# Patient Record
Sex: MALE | Race: BLACK OR AFRICAN AMERICAN | NOT HISPANIC OR LATINO | Employment: FULL TIME | URBAN - METROPOLITAN AREA
[De-identification: names, ages, dates, MRNs, and addresses within clinical notes are randomized per-mention and may not be internally consistent; named-entity substitution may affect disease eponyms.]

---

## 2019-08-04 ENCOUNTER — OFFICE VISIT (OUTPATIENT)
Dept: URGENT CARE | Facility: CLINIC | Age: 54
End: 2019-08-04
Payer: COMMERCIAL

## 2019-08-04 VITALS
TEMPERATURE: 98.7 F | DIASTOLIC BLOOD PRESSURE: 66 MMHG | HEIGHT: 72 IN | BODY MASS INDEX: 42.66 KG/M2 | OXYGEN SATURATION: 97 % | RESPIRATION RATE: 18 BRPM | WEIGHT: 315 LBS | SYSTOLIC BLOOD PRESSURE: 128 MMHG | HEART RATE: 58 BPM

## 2019-08-04 DIAGNOSIS — M54.50 ACUTE RIGHT-SIDED LOW BACK PAIN WITHOUT SCIATICA: Primary | ICD-10-CM

## 2019-08-04 LAB
SL AMB  POCT GLUCOSE, UA: NEGATIVE
SL AMB LEUKOCYTE ESTERASE,UA: NEGATIVE
SL AMB POCT BILIRUBIN,UA: NEGATIVE
SL AMB POCT BLOOD,UA: ABNORMAL
SL AMB POCT CLARITY,UA: YELLOW
SL AMB POCT COLOR,UA: YELLOW
SL AMB POCT KETONES,UA: NEGATIVE
SL AMB POCT NITRITE,UA: NEGATIVE
SL AMB POCT PH,UA: 6.5
SL AMB POCT SPECIFIC GRAVITY,UA: 1.01
SL AMB POCT URINE PROTEIN: NEGATIVE
SL AMB POCT UROBILINOGEN: 0.2

## 2019-08-04 PROCEDURE — 81002 URINALYSIS NONAUTO W/O SCOPE: CPT | Performed by: PHYSICIAN ASSISTANT

## 2019-08-04 PROCEDURE — 99213 OFFICE O/P EST LOW 20 MIN: CPT | Performed by: PHYSICIAN ASSISTANT

## 2019-08-04 RX ORDER — IBUPROFEN 800 MG/1
800 TABLET ORAL EVERY 8 HOURS PRN
Qty: 30 TABLET | Refills: 0 | Status: SHIPPED | OUTPATIENT
Start: 2019-08-04

## 2019-08-04 RX ORDER — METHOCARBAMOL 500 MG/1
500 TABLET, FILM COATED ORAL 3 TIMES DAILY
Qty: 40 TABLET | Refills: 0 | Status: SHIPPED | OUTPATIENT
Start: 2019-08-04

## 2019-08-04 NOTE — PROGRESS NOTES
[facility]        NAME: Jerry Garcia  is a 49 y o  male  : 1965    MRN: [id number]  DATE: 2019  TIME: 1:30 PM    Assessment and Plan   Acute right-sided low back pain without sciatica [M54 5]  1  Acute right-sided low back pain without sciatica  ibuprofen (MOTRIN) 800 mg tablet    methocarbamol (ROBAXIN) 500 mg tablet    Ambulatory referral to Physical Therapy    POCT urine dip         Patient Instructions     Urine dip with trace blood, no CVA tenderness  Does not appear to be urinary, more muscular pain in the back  Follow up with PCP in 3-5 days  Proceed to  ER if symptoms worsen  Chief Complaint     Chief Complaint   Patient presents with    Flank Pain     PT complains symptom started about x1 wk ago         History of Present Illness         49year-old male complains of 2 weeks of right-sided back pain  Says it radiates around to the right side of his hip a little bit  Worse when he lays down or sits up from lying down  He has pain with movement  No change in urination  No blood in his urine  No change in urination smell  No frequency or dysuria  No meds over-the-counter for this  He does report a history of "bulge disc"      Review of Systems   Review of Systems   Constitutional: Negative  HENT: Negative  Eyes: Negative  Respiratory: Negative  Cardiovascular: Negative  Gastrointestinal: Negative  Genitourinary: Negative  Musculoskeletal: Positive for back pain           Current Medications       Current Outpatient Medications:     ibuprofen (MOTRIN) 800 mg tablet, Take 1 tablet (800 mg total) by mouth every 8 (eight) hours as needed for mild pain, Disp: 30 tablet, Rfl: 0    methocarbamol (ROBAXIN) 500 mg tablet, Take 1 tablet (500 mg total) by mouth 3 (three) times a day, Disp: 40 tablet, Rfl: 0    Current Allergies     Allergies as of 2019    (No Known Allergies)            The following portions of the patient's history were reviewed and updated as appropriate: allergies, current medications, past family history, past medical history, past social history, past surgical history and problem list      History reviewed  No pertinent past medical history  History reviewed  No pertinent surgical history  Family History   Problem Relation Age of Onset    Diabetes Mother     Heart disease Father          Medications have been verified  Objective   /66 (BP Location: Right arm, Patient Position: Sitting, Cuff Size: Large)   Pulse 58   Temp 98 7 °F (37 1 °C)   Resp 18   Ht 6' (1 829 m)   Wt (!) 145 kg (319 lb)   SpO2 97%   BMI 43 26 kg/m²        Physical Exam     Physical Exam   Constitutional: He appears well-developed and well-nourished  No distress  Cardiovascular: Normal rate and regular rhythm  Pulmonary/Chest: Effort normal and breath sounds normal    Abdominal: Soft  Bowel sounds are normal  He exhibits no distension  There is no tenderness  There is no CVA tenderness  Musculoskeletal:   Lumbar spine nontender  Right paravertebral muscles and SI joint mildly tender  He has full back range of motion with painful flexion  He has pain with lying down and sitting up  Negative straight leg raise bilaterally  Bilateral lower extremity strength 5/5

## 2019-08-04 NOTE — LETTER
August 4, 2019     Patient: Gabriella Sauceda  YOB: 1965   Date of Visit: 8/4/2019       To Whom it May Concern:    Tabby Hsu is under my professional care  He was seen in my office on 8/4/2019  He may return to work on 8/6/19  If you have any questions or concerns, please don't hesitate to call           Sincerely,          Carley Delacruz PA-C        CC: No Recipients

## 2019-08-04 NOTE — PATIENT INSTRUCTIONS
Acute Low Back Pain   AMBULATORY CARE:   Acute low back pain  is sudden discomfort in your lower back area that lasts for up to 6 weeks  The discomfort makes it difficult to tolerate activity  Common symptoms include the following:   · Back stiffness or spasms    · Pain down the back or side of one leg    · Holding yourself in an unusual position or posture to decrease your back pain    · Not being able to find a sitting position that is comfortable    · Slow increase in your pain for 24 to 48 hours after you stress your back    · Tenderness on your lower back or severe pain when you move your back  Seek immediate care for the following symptoms:   · Severe pain    · Sudden stiffness and heaviness in both buttocks down to both legs    · Numbness or weakness in one leg, or pain in both legs    · Numbness in your genital area or across your lower back    · Unable to control your urine or bowel movements  Contact your healthcare provider if:   · You have a fever  · You have pain at night or when you rest     · Your pain does not get better with treatment  · You have pain that worsens when you cough or sneeze  · You suddenly feel something pop or snap in your back  · You have questions or concerns about your condition or care  The goal of treatment for acute low back pain  is to relieve your pain and help you tolerate activity  Most people with acute lower back pain get better within 4 to 6 weeks  You may need any of the following:  · Acetaminophen  decreases pain  It is available without a doctor's order  Ask how much to take and how often to take it  Follow directions  Acetaminophen can cause liver damage if not taken correctly  · NSAIDs  help decrease swelling and pain  This medicine is available with or without a doctor's order  NSAIDs can cause stomach bleeding or kidney problems in certain people   If you take blood thinner medicine, always ask your healthcare provider if NSAIDs are safe for you  Always read the medicine label and follow directions  · Prescription pain medicine  may be given  Ask your healthcare provider how to take this medicine safely  · Muscle relaxers  decrease pain by relaxing the muscles in your lower spine  Manage your symptoms:   · Stay active  as much as you can without causing more pain  Bed rest could make your back pain worse  Start with some light exercises such as walking  Avoid heavy lifting until your pain is gone  Ask for more information about the activities or exercises that are right for you  · Ice  helps decrease swelling, pain, and muscle spams  Put crushed ice in a plastic bag  Cover it with a towel  Place it on your lower back for 20 to 30 minutes every 2 hours  Do this for about 2 to 3 days after your pain starts, or as directed  · Heat  helps decrease pain and muscle spasms  Start to use heat after treatment with ice has stopped  Use a small towel dampened with warm water or a heating pad, or sit in a warm bath  Apply heat on the area for 20 to 30 minutes every 2 hours for as many days as directed  Alternate heat and ice  Prevent acute low back pain:   · Use proper body mechanics  ¨ Bend at the hips and knees when you  objects  Do not bend from the waist  Use your leg muscles as you lift the load  Do not use your back  Keep the object close to your chest as you lift it  Try not to twist or lift anything above your waist     ¨ Change your position often when you stand for long periods of time  Rest one foot on a small box or footrest, and then switch to the other foot often  ¨ Try not to sit for long periods of time  When you do, sit in a straight-backed chair with your feet flat on the floor  Never reach, pull, or push while you are sitting  · Do exercises that strengthen your back muscles  Warm up before you exercise  Ask your healthcare provider the best exercises for you  · Maintain a healthy weight    Ask your healthcare provider how much you should weigh  Ask him to help you create a weight loss plan if you are overweight  Follow up with your healthcare provider as directed:  Return for a follow-up visit if you still have pain after 1 to 3 weeks of treatment  You may need to visit an orthopedist if your back pain lasts more than 12 weeks  Write down your questions so you remember to ask them during your visits  © 2017 [employer] Information is for End User's use only and may not be sold, redistributed or otherwise used for commercial purposes  All illustrations and images included in CareNotes® are the copyrighted property of A [employer]  or David [employer]  The above information is an  only  It is not intended as medical advice for individual conditions or treatments  Talk to your doctor, nurse or pharmacist before following any medical regimen to see if it is safe and effective for you

## 2019-08-12 ENCOUNTER — EVALUATION (OUTPATIENT)
Dept: PHYSICAL THERAPY | Facility: CLINIC | Age: 54
End: 2019-08-12
Payer: COMMERCIAL

## 2019-08-12 DIAGNOSIS — M54.50 ACUTE RIGHT-SIDED LOW BACK PAIN WITHOUT SCIATICA: ICD-10-CM

## 2019-08-12 PROCEDURE — 97110 THERAPEUTIC EXERCISES: CPT | Performed by: PHYSICAL THERAPIST

## 2019-08-12 PROCEDURE — 97161 PT EVAL LOW COMPLEX 20 MIN: CPT | Performed by: PHYSICAL THERAPIST

## 2019-08-12 NOTE — PROGRESS NOTES
PT Evaluation     Today's date: 2019  Patient name: Dianne Harris  : 1965  MRN: [id number]  Referring provider: Sherin Severin, PA-C  Dx:   Encounter Diagnosis     ICD-10-CM    1  Acute right-sided low back pain without sciatica M54 5 Ambulatory referral to Physical Therapy                  Assessment  Assessment details: Dianne Harris  is a pleasant 49 y o  presenting to physical therapy with MD referral for Acute right-sided low back pain without sciatica  Problem list:  Limited lumbar ROM, decreased hip/core strength, limited lower extremity flexibility, decreased balance, and increased muscle tension  Treatment to include: Manual therapy techniques, lower extremity/core strengthening, neuromuscular control exercises, balance/proprioception training, instruction in a comprehensive HEP, and modalities as needed  This pt would benefit from skilled PT services to address their impairments and functional limitations to maximize functional outcome  Symptom irritability: lowBarriers to therapy: High BMI, financial concerns of high copay ($40 a visit)  Understanding of Dx/Px/POC: good   Prognosis: good    Goals  ST  Pt will improve hip IR to no more than moderate restriction in 2 weeks  2  Pt will improve lumbar SB ROM to at least 20 degrees bilaterally in 2 weeks  LT  Pt will be able to bend forward to  items from the floor with no more than mild discomfort in 4 weeks  2  Pt will be independent in a comprehensive HEP in 4 weeks  Plan  Plan details: Pt would like to attend PT a few sessions due to high copay and would like to learn exercises to perform independently at a gym    Patient would benefit from: skilled physical therapy  Frequency: 1x week  Duration in weeks: 4  Treatment plan discussed with: patient        Subjective Evaluation    History of Present Illness  Mechanism of injury: Patient reports onset of lower back pain 3 weeks ago and he twisted while sitting on a bed and felt spasms/pulling sensation in right side of lower back  Pt states the pain continued to intensify causing him difficulty moving  Pt states he self treated by laying on the couch  Pt reports a week later, he went to Urgent Care who prescribed pt muscle relaxors and ibuprofen  Pt states he got a new mattress and has been taking the muscle relaxors  Pt states since changing his mattress, he has noticed improvement in symptoms  Pt denies any tingling in his legs  Pt states he has gained 50# over the past two years which he attributes as a precipitating factor to his lower back pain  Premorbid status:  - ADLs: Independent with mild difficulty  - Work: Full time, Full duty  - Recreation: none    Current status:   - ADLs/Functional activities:    - Pushing the lawnmower (1 acre) with no pain during, moderate pain/stiffness afterwards   - Bending forward to don/doff socks and shoes with moderate pain and difficulty   - picking items up from the floor with moderate pain    - Sitting 45 minutes prior to increase in pain  - Work: Full time, Full duty-   - Recreation: none  Pain  Current pain ratin  At best pain ratin  At worst pain ratin  Location: across lower back  Quality: tight and cramping  Relieving factors: change in position  Aggravating factors: sitting  Progression: improved    Treatments  Previous treatment: medication  Current treatment: physical therapy  Patient Goals  Patient goals for therapy: decreased pain  Patient goal: learn an exercise routine for the gym        Objective     Palpation   Left   No palpable tenderness to the erector spinae  Right   Muscle spasm in the erector spinae  Tenderness of the erector spinae  Cervical Spine Comments  Right erector spinae: right L4-S1 levels       Active Range of Motion     Lumbar   Flexion: 90 degrees   Extension: 20 degrees   Left lateral flexion: 15 degrees       Right lateral flexion: 15 degrees     Joint Play     Hypomobile: L1, L2, L3, L4, L5 and S1     Pain: L5 and S1   L5 comments: Reports of needle like pain in lower back    Strength/Myotome Testing     Left Hip   Planes of Motion   Flexion: 5  External rotation: 4  Internal rotation: 4    Right Hip   Planes of Motion   Flexion: 5  External rotation: 4  Internal rotation: 4    Left Knee   Flexion: 5  Extension: 5    Right Knee   Flexion: 5  Extension: 5    Left Ankle/Foot   Dorsiflexion: 5  Plantar flexion: 5    Right Ankle/Foot   Dorsiflexion: 5  Plantar flexion: 5    Additional Strength Details  SLS R: 17 seconds  SLS L: 7 seconds    Flexibility:  - HS: minimal restriction on B  - Hip IR: severe restriction on R, mod restriction on L  - Hip ER: minimal restriction B    Tests   Cervical   Negative vertical compression  Lumbar   Negative vertical compression and sacral spring   Left   Negative passive SLR and quadrant  Right   Negative passive SLR and quadrant  Left Hip   Negative JANA  Right Hip   Negative JANA         Flowsheet Rows      Most Recent Value   PT/OT G-Codes   Current Score  57   Projected Score  73             Precautions: 330# (check weight capacity on equipment)      Manual  8-12 (IE)            STM to right lumbar PVMs as needed NV                                                                    Exercise Diary  8-12 (IE)            NuStep 5 mins, levell 5 NV                         Seated:             - pball QL str 4 x 30" ea NV                         Standing:             - hip flexor str 2 x 30" ea NV            - hip ext  2 x 10 ea NV            - hip abd with ext  2 x 10 ea NV                         - front step ups             - lateral step up and overs             - squats                                       Table:             - PPT 10 x 10" NV            - piriformis str 4 x 30" ea NV            - TA Bridges with TB around knees 20 x 5" hold GTB NV            - SL hip ER 20 x 5   hold ea GTB NV                                                       Modalities                                                       * On initial evaluation, educated pt on anatomy, pathology, and exercise rationale  Provided pt with basic HEP and ensured proper exercise performance  Educated pt to call with any questions or concerns  Access Code: [email or web address]   URL: [email or web address]   Date: 08/12/2019   Prepared by: Christopher Mohr      Exercises  · Supine Lower Trunk Rotation - 10 reps - 2 seconds hold - 3x daily  · Supine Single Knee to Chest - 10 reps - 10 seconds hold - 3x daily  · Seated Piriformis Stretch with Trunk Bend - 4 reps - 30 seconds hold - 3x daily

## 2019-08-22 ENCOUNTER — OFFICE VISIT (OUTPATIENT)
Dept: PHYSICAL THERAPY | Facility: CLINIC | Age: 54
End: 2019-08-22
Payer: COMMERCIAL

## 2019-08-22 DIAGNOSIS — M54.50 ACUTE RIGHT-SIDED LOW BACK PAIN WITHOUT SCIATICA: Primary | ICD-10-CM

## 2019-08-22 PROCEDURE — 97110 THERAPEUTIC EXERCISES: CPT

## 2019-08-22 PROCEDURE — 97112 NEUROMUSCULAR REEDUCATION: CPT

## 2019-08-22 NOTE — PROGRESS NOTES
Daily Note     Today's date: 2019  Patient name: Cindy Capps  : 1965  MRN: [id number]  Referring provider: Rachel Ortiz PA-C  Dx:   Encounter Diagnosis     ICD-10-CM    1  Acute right-sided low back pain without sciatica M54 5        Start Time: 1600  Stop Time: 1650  Total time in clinic (min): 50 minutes    Subjective: Patient states, "I feel pretty good today  It's getting better  I think it's a combo of me getting a new bed and adjusting to my new job  I also, have been taking less muscle relaxer"  Objective: See treatment diary below    Assessment: Tolerated treatment well  Patient would benefit from continued PT  Patient had some increase in discomfort during PPT and bridges but he was still able to complete the exercise  Patient is partially compliant to his HEP noting "I do the exercises sometimes  I know I need to do them more often"  Plan: Continue per plan of care        Precautions: 330# (check weight capacity on equipment)    Manual  8-12 (IE)            STM to right lumbar PVMs as needed NV                                                                  Exercise Diary  8- (IE)            NuStep 5 mins, levell 5 NV 5 min L5                        Seated:             - pball QL str 4 x 30" ea NV 4x30"  ea                        Standing:             - hip flexor str 2 x 30" ea NV 2x30" ea           - hip ext  2 x 10 ea NV 2x10 ea           - hip abd with ext  2 x 10 ea NV 2x10 ea                        - front step ups             - lateral step up and overs             - squats                                       Table:             - PPT 10 x 10" NV 10 x 10"           - piriformis str 4 x 30" ea NV 4x30" ea           - TA Bridges with TB around knees 20 x 5" hold GTB NV 10 x 5"  GTB           - SL hip ER 20 x 5   hold ea GTB NV 20 x 5 hold ea GTB                                                    Modalities

## 2019-09-10 NOTE — PROGRESS NOTES
Addendum 9-10-19: Resolved episode of care due to non-compliance with POC frequency  Pt has attended 2 sessions and cancelled/no showed to the last 3 visits

## 2020-10-16 ENCOUNTER — OFFICE VISIT (OUTPATIENT)
Dept: URGENT CARE | Facility: MEDICAL CENTER | Age: 55
End: 2020-10-16
Payer: COMMERCIAL

## 2020-10-16 VITALS — TEMPERATURE: 96 F | HEART RATE: 72 BPM | OXYGEN SATURATION: 98 %

## 2020-10-16 DIAGNOSIS — R19.7 DIARRHEA, UNSPECIFIED TYPE: ICD-10-CM

## 2020-10-16 DIAGNOSIS — J02.9 SORE THROAT: ICD-10-CM

## 2020-10-16 DIAGNOSIS — R05.9 COUGH: Primary | ICD-10-CM

## 2020-10-16 LAB — SARS-COV-2 RNA RESP QL NAA+PROBE: NEGATIVE

## 2020-10-16 PROCEDURE — 99213 OFFICE O/P EST LOW 20 MIN: CPT | Performed by: PHYSICIAN ASSISTANT

## 2020-10-16 PROCEDURE — 87635 SARS-COV-2 COVID-19 AMP PRB: CPT | Performed by: PHYSICIAN ASSISTANT

## 2020-10-16 RX ORDER — BENZONATATE 100 MG/1
100 CAPSULE ORAL 3 TIMES DAILY PRN
Qty: 20 CAPSULE | Refills: 0 | Status: SHIPPED | OUTPATIENT
Start: 2020-10-16

## 2023-03-20 ENCOUNTER — APPOINTMENT (OUTPATIENT)
Dept: RADIOLOGY | Facility: MEDICAL CENTER | Age: 58
End: 2023-03-20

## 2023-03-20 ENCOUNTER — TELEPHONE (OUTPATIENT)
Dept: OBGYN CLINIC | Facility: MEDICAL CENTER | Age: 58
End: 2023-03-20

## 2023-03-20 ENCOUNTER — TELEPHONE (OUTPATIENT)
Dept: OBGYN CLINIC | Facility: CLINIC | Age: 58
End: 2023-03-20

## 2023-03-20 ENCOUNTER — OCCMED (OUTPATIENT)
Dept: URGENT CARE | Facility: MEDICAL CENTER | Age: 58
End: 2023-03-20

## 2023-03-20 DIAGNOSIS — S62.345A CLOSED NONDISPLACED FRACTURE OF BASE OF FOURTH METACARPAL BONE OF LEFT HAND, INITIAL ENCOUNTER: ICD-10-CM

## 2023-03-20 DIAGNOSIS — S67.22XA CRUSHING INJURY OF LEFT HAND, INITIAL ENCOUNTER: ICD-10-CM

## 2023-03-20 DIAGNOSIS — S62.343A CLOSED NONDISPLACED FRACTURE OF BASE OF THIRD METACARPAL BONE OF LEFT HAND, INITIAL ENCOUNTER: Primary | ICD-10-CM

## 2023-03-20 NOTE — TELEPHONE ENCOUNTER
Left message to schedule pt needs asap apt with hand, there is an opening with dr Timothy Lockhart on Thursday please serg or let me know you need assistance when he calls back

## 2023-03-20 NOTE — TELEPHONE ENCOUNTER
Refer to Orthopedics  Diagnosis: fractured left proximal 3rd, 4th metacarpals  What is the rationale for your referral? Evaluation and treat  What are the objective findings on the exam/diagnostics that support the diagnosis? fractures as above  What imaging studies have been done? X-ray  What is the causality of the diagnosis? Work Related  Comment if you would like the examining orthopedist to assist with causality from an orthopedic standpoint   No   [phone]

## 2023-03-20 NOTE — TELEPHONE ENCOUNTER
----- Message from Shira Mandel RN sent at 3/20/2023  1:39 PM EDT -----  Regarding: Please schedule appointment  Hi,     Please schedule Chris Will with Ortho Hand specialist      Refer to Orthopedics  Diagnosis: fractured left proximal 3rd, 4th metacarpals  What is the rationale for your referral? Evaluation and treat  What are the objective findings on the exam/diagnostics that support the diagnosis? fractures as above  What imaging studies have been done? X-ray  What is the causality of the diagnosis? Work Related  Comment if you would like the examining orthopedist to assist with causality from an orthopedic standpoint   No     Thank you,   leon

## 2023-03-23 VITALS — BODY MASS INDEX: 42.66 KG/M2 | HEIGHT: 72 IN | WEIGHT: 315 LBS

## 2023-03-23 DIAGNOSIS — S62.92XA CLOSED FRACTURE OF LEFT HAND, INITIAL ENCOUNTER: Primary | ICD-10-CM

## 2023-03-23 NOTE — LETTER
March 23, 2023     Patient: Christian Dillard  YOB: 1965  Date of Visit: 3/23/2023      To Whom it May Concern:    Jolanta Bourne is under my professional care  Rene Romero was seen in my office on 3/23/2023  Rene Romero is able to work with restrictions  Restrictions include no use of the left hand  If unable to accommodate he should be out of work  These restrictions are in place until next visit  Further updates will be provided at that time  If you have any questions or concerns, please don't hesitate to call           Sincerely,          Salma Mariano MD        CC: No Recipients

## 2023-03-23 NOTE — PROGRESS NOTES
Assessment:    Left base of 5th metacarpal fracture, non-displaced, closed      Plan: Nonweightbearing to the left hand and removable Velcro wrist brace  OK to remove for hygiene and range of motion  Finger range of motion is encouraged  Over-the-counter pain medications as needed  Work note provided  Follow-up 3 to 4 weeks for reevaluation          Problem List Items Addressed This Visit        Musculoskeletal and Integument    Closed left hand fracture - Primary    Relevant Orders    Durable Medical Equipment                Subjective:     HPI    Patient ID:  Kelle Ortega  is a right-hand-dominant 63 y o  male presenting for evaluation of the left hand  According to the patient, few days ago he had a work-related injury  A 55 inch diameter, 2 inch thick piece of steel fell directly on top of his left hand he felt immediate pain and swelling to the area  He eventually had x-rays which demonstrated possible base of third and fourth metacarpal fracture  He was referred here for further evaluation  Today the patient states overall his pain is slightly better however it is still present and well localized to the dorsal ulnar hand with radiation to the mid dorsum region  No associated numbness and tingling into the fingers  He never noticed any open wound  He is a  by [employer]  No history of surgery to the left hand or wrist       The following portions of the patient's history were reviewed and updated as appropriate: allergies, current medications, past family history, past medical history, past social history, past surgical history, and problem list     Review of Systems     Objective:    Imaging:  Left hand x-rays 3/20/2023  IMPRESSION:     Proximal 3rd and 4th metacarpal nondisplaced fractures  Cannot exclude proximal 4th metacarpal and capitate involvement        Physical Exam     General appearance:  NAD   Cardiac:  Regular rate  Lungs:  Unlabored breathing  Abdomen: Non-distended    Orthopedic Examination:  Left hand    Inspection: No open wounds or erythema  There is mild dorsal swelling  No ecchymosis  Palpation: Tender to palpation base of fifth metacarpal   To a lesser degree the base of third and fourth metacarpal region tender  Range-of-motion: Full composite fist formation  Normal wrist range of motion      Strength: Deferred    Sensation: Intact to light touch    Special Tests: Palpable radial pulse

## 2023-03-28 ENCOUNTER — OFFICE VISIT (OUTPATIENT)
Dept: OBGYN CLINIC | Facility: OTHER | Age: 58
End: 2023-03-28

## 2023-03-28 ENCOUNTER — APPOINTMENT (OUTPATIENT)
Dept: RADIOLOGY | Facility: OTHER | Age: 58
End: 2023-03-28

## 2023-03-28 VITALS
HEIGHT: 72 IN | WEIGHT: 315 LBS | DIASTOLIC BLOOD PRESSURE: 75 MMHG | HEART RATE: 77 BPM | BODY MASS INDEX: 42.66 KG/M2 | SYSTOLIC BLOOD PRESSURE: 119 MMHG

## 2023-03-28 DIAGNOSIS — M25.512 ACUTE PAIN OF LEFT SHOULDER: Primary | ICD-10-CM

## 2023-03-28 DIAGNOSIS — M24.812 INTERNAL DERANGEMENT OF LEFT SHOULDER: ICD-10-CM

## 2023-03-28 DIAGNOSIS — M25.512 ACUTE PAIN OF LEFT SHOULDER: ICD-10-CM

## 2023-03-28 NOTE — PROGRESS NOTES
Assessment  Diagnoses and all orders for this visit:    Acute pain of left shoulder    Internal derangement of left shoulder        Discussion and Plan:    Patient has an examination worrisome for acute rotator cuff pathology -specifically an acute rotator cuff tear, given the mechanism of injury with associated weakness on exam he is indicated at this time for an MRI scan to evaluate for surgical pathology  Rotator cuff avulsions have been described with a very similar mechanism especially with trying to extricate the arm from underneath the plate can cause contraction of the cuff musculature and failure at the bone tendon junction which if present the MRI scan will help us understand and guide me in further treatment options  We will review the results of the study when it has been obtained and discuss further treatment options  Recommend patient start physical therapy in the mean-time to maintain and regain range of motion and he certainly is limited at this time with restrictions for his left hand fracture which I would defer any further restrictions for the left shoulder until I have more information to base those restrictions upon  Subjective:   Patient ID: Christina Parmar  is a 63 y o  male      HPI  The patient presents with a chief complaint of left shoulder pain  The pain began 2 week(s) ago and is associated with an acute injury  Patient reports on 3/14/23 a 1400 lb plate fell on his arm  The patient describes the pain as aching, dull and sharp in intensity,  intermittent in timing, and localizes the pain to the  left subacromial joint, anterior shoudler  The pain is worse with movement and raising arm over head and relieved by rest   Patient reports occasional numbness and tingling in the left hand since the injury  The pain is not associated with constitutional symptoms  The patient is not awoken at night by the pain    The pain did begin after the injury on March 14 as documented above   The patient is being seen by Dr Santana Nieves for left 5th MC fractures for this injury as well  The following portions of the patient's history were reviewed and updated as appropriate: allergies, current medications, past family history, past medical history, past social history, past surgical history and problem list       Objective:  /75 (BP Location: Right arm, Patient Position: Sitting, Cuff Size: Adult)   Pulse 77   Ht 6' (1 829 m) Comment: verbal  Wt (!) 156 kg (344 lb)   BMI 46 65 kg/m²       Left Shoulder Exam     Tenderness   Left shoulder tenderness location: subacromial, anterior deltoid  Range of Motion   External rotation: 60   Forward flexion: 130 (PROM 180)     Muscle Strength   Abduction: 3/5   External rotation: 3/5     Tests   Mcgee test: positive  Impingement: positive    Other   Erythema: absent  Sensation: normal  Pulse: present             Physical Exam  Vitals reviewed  Constitutional:       Appearance: He is well-developed  HENT:      Head: Normocephalic  Eyes:      Pupils: Pupils are equal, round, and reactive to light  Pulmonary:      Effort: Pulmonary effort is normal    Abdominal:      General: Abdomen is flat  There is no distension  Skin:     General: Skin is warm and dry  I have personally reviewed pertinent films in PACS and my interpretation is as follows    Left shoulder x-rays demonstrates no fracture or dislocation    Scribe Attestation    I,:  Chelsey Saavedra am acting as a scribe while in the presence of the attending physician :       I,:  Yan Acevedo MD personally performed the services described in this documentation    as scribed in my presence :

## 2023-03-29 ENCOUNTER — TELEPHONE (OUTPATIENT)
Dept: OBGYN CLINIC | Facility: HOSPITAL | Age: 58
End: 2023-03-29

## 2023-03-29 NOTE — TELEPHONE ENCOUNTER
Caller: Lalo from [address] [facility]     Doctor/Office: Kale/Johnie    CB#: [address] [facility] office kindly fax the following OVNS + documents :     What needs to be faxed: DR Betzy Rodriguez + MRI order Dr America Servin + work letter from dr Javi Echeverria to: [facility]     Fax#: [phone]

## 2023-03-31 ENCOUNTER — TELEPHONE (OUTPATIENT)
Dept: OBGYN CLINIC | Facility: HOSPITAL | Age: 58
End: 2023-03-31

## 2023-03-31 NOTE — TELEPHONE ENCOUNTER
Caller: Patient    Doctor: Mykel    Reason for call: Patient asking if the script for the MRI has been faxed?     Call back#: [phone]

## 2023-03-31 NOTE — TELEPHONE ENCOUNTER
ΣΑΡΑΝΤΙ from [facility] MRI Everardo Herrera is needed via patients primary insurance (NOT work comp)    Caller: ΣΑΡΑΝΤΙ from [address] [facility] or [address] [place] [address] Required: MRI L shoulder    Callback#:  [facility] [address]: [address] [place] [address] phone #: N/A    Patient's Member ID: N/A    Date of appt: April 19TH     NPI :  [id number]     FAX# [facility] [phone]

## 2023-03-31 NOTE — TELEPHONE ENCOUNTER
Caller: Leigh Ann Salazar from [address] [place] [address]    Doctor: Johnie/Kale    Reason for call: electronically faxed 3/23 and 3/29 OVN to fax #  [phone] and MRI scripts still needed      Call back#:

## 2023-04-03 ENCOUNTER — TELEPHONE (OUTPATIENT)
Dept: OBGYN CLINIC | Facility: OTHER | Age: 58
End: 2023-04-03

## 2023-04-03 ENCOUNTER — TELEPHONE (OUTPATIENT)
Dept: OBGYN CLINIC | Facility: HOSPITAL | Age: 58
End: 2023-04-03

## 2023-04-03 NOTE — TELEPHONE ENCOUNTER
Caller: Lalo from [address] [place] [address] Group    Doctor: Dr Alo Yancey    Reason for call: Lucita Montenegro from [facility] calling stating that all left shoulder treatment should be on workman's comp  Lucita Montenegro is asking to speak to [facility]      Call back#: [phone]

## 2023-04-03 NOTE — TELEPHONE ENCOUNTER
I spoke with patient regarding his mri scheduled, I called to ask if he has done PT for his left shoulder so I can work on his insurance authorization  Patient stated this is a work comp case and refuses us to Tan Devante his primary insurance  He verified that he has an appt with Jayme Goodman on 4/19 which they sent a request to precert primary insurance for left shoulder mri but again patient repeatedly said he does not want us to precert his primary insurance as this is work comp  I told patient we always precert primary as a back up for w/c but he said he did not want us to precert through his primary insurance  He stated he would get in touch with w/c and [facility] and to not precert primary

## 2023-04-03 NOTE — TELEPHONE ENCOUNTER
Caller: Patient     Doctor: Linda Augustin     Reason for call: Patient would like to speak with someone regarding his W/C covering his MRI  Call was warm transferred to surgery coord         Call back#: [phone]

## 2023-04-13 PROBLEM — S46.012A TRAUMATIC INCOMPLETE TEAR OF LEFT ROTATOR CUFF: Status: ACTIVE | Noted: 2023-04-13

## 2023-05-03 ENCOUNTER — OFFICE VISIT (OUTPATIENT)
Dept: OBGYN CLINIC | Facility: CLINIC | Age: 58
End: 2023-05-03

## 2023-05-03 VITALS
BODY MASS INDEX: 42.66 KG/M2 | SYSTOLIC BLOOD PRESSURE: 140 MMHG | WEIGHT: 315 LBS | DIASTOLIC BLOOD PRESSURE: 72 MMHG | HEIGHT: 72 IN

## 2023-05-03 DIAGNOSIS — S62.92XD CLOSED FRACTURE OF LEFT HAND WITH ROUTINE HEALING, SUBSEQUENT ENCOUNTER: Primary | ICD-10-CM

## 2023-05-03 RX ORDER — COVID-19 ANTIGEN TEST
KIT MISCELLANEOUS
COMMUNITY

## 2023-05-03 NOTE — LETTER
May 3, 2023     Patient: Nikki Herrera  YOB: 1965  Date of Visit: 5/3/2023      To Whom it May Concern:    Chiara Davis is under my professional care  Meño Ansari was seen in my office on 5/3/2023  Meño Ansari may return to work as far as his left hand is concerned  He has Left shoulder issues which are being treated by another provider, so that is presently limiting his full duty status at the present time  If you have any questions or concerns, please don't hesitate to call           Sincerely,          Krystle Marie MD        CC: No Recipients

## 2023-05-04 ENCOUNTER — TELEPHONE (OUTPATIENT)
Dept: OBGYN CLINIC | Facility: HOSPITAL | Age: 58
End: 2023-05-04

## 2023-05-04 NOTE — TELEPHONE ENCOUNTER
Caller: Janak Pimentel - [facility]     Doctor/Office: jessica RIVERA#: [phone]      What needs to be faxed: Office visit note    ATTN to: Janak Pimentel    Fax#: [phone]      Documents were successfully e-faxed

## 2023-05-12 ENCOUNTER — TELEPHONE (OUTPATIENT)
Dept: OBGYN CLINIC | Facility: HOSPITAL | Age: 58
End: 2023-05-12

## 2023-05-12 NOTE — TELEPHONE ENCOUNTER
Caller: [facility]    Doctor: [address] [facility]    Reason for call: WC was calling to verify pt's next appt    Call back#:

## 2023-06-19 ENCOUNTER — OFFICE VISIT (OUTPATIENT)
Dept: OBGYN CLINIC | Facility: OTHER | Age: 58
End: 2023-06-19
Payer: OTHER MISCELLANEOUS

## 2023-06-19 VITALS — WEIGHT: 315 LBS | BODY MASS INDEX: 42.66 KG/M2 | HEIGHT: 72 IN

## 2023-06-19 DIAGNOSIS — S46.012D TRAUMATIC INCOMPLETE TEAR OF LEFT ROTATOR CUFF, SUBSEQUENT ENCOUNTER: Primary | ICD-10-CM

## 2023-06-19 PROCEDURE — 99214 OFFICE O/P EST MOD 30 MIN: CPT | Performed by: ORTHOPAEDIC SURGERY

## 2023-06-19 RX ORDER — CHLORHEXIDINE GLUCONATE 0.12 MG/ML
15 RINSE ORAL ONCE
OUTPATIENT
Start: 2023-06-19 | End: 2023-06-19

## 2023-06-19 NOTE — H&P (VIEW-ONLY)
Assessment  Diagnoses and all orders for this visit:    Traumatic incomplete tear of left rotator cuff, subsequent encounter      Discussion and Plan:  The patient continues to be symptomatic with his left traumatic partial rotator cuff tear despite physical therapy and an injection. He does state the therapist felt he should have surgery to repair the rotator cuff as the therapist felt he was not progressing with physical therapy and that "therapy can only do so much". Given his lack of improvement with appropriate nonoperative care he is indicated for arthroscopic evaluation and repair versus debridement of his partial-thickness rotator cuff tear. He will be scheduled accordingly for this procedure    A thorough discussion was performed with the patient regarding the risks and benefit of operative and nonoperative treatment of their rotator cuff tear. Risks discussed include but were not limited to infection, neurovascular injury, recurrent tear, nonhealing of the repair, need for further surgery, need for biceps tenodesis or tenotomy, stiffness, need for prolonged rehabilitation, as well as the risk of anesthesia. After this discussion all questions were answered and informed consent was obtained in the office for arthroscopic rotator cuff repair of the left shoulder. The patient will be scheduled for this procedure accordingly. Recommend physical therapy be completed at [facility] following surgery for communication purposes if possible. Subjective:   Patient ID: Brandon Pitts. is a 63 y.o. male      HPI  Patient presents today for follow up of left partial supraspinatus tear. Patient was provided with a CS injection and referral to PT at the last visit. This is a WC injury which occurred on 3/14/23 a 1400 lb plate fell on his arm. Patient has been attending PT at [address] [place] [address].        The following portions of the patient's history were reviewed and updated as appropriate: allergies, current medications, past family history, past medical history, past social history, past surgical history and problem list.      Objective:  Ht 6' (1.829 m)   Wt (!) 159 kg (351 lb)   BMI 47.60 kg/m²       Left Shoulder Exam     Range of Motion   External rotation: 70   Forward flexion: 150 (FULL PROM)   Internal rotation 0 degrees: Lumbar     Muscle Strength   Abduction: 4/5   External rotation: 5/5     Other   Erythema: absent  Sensation: normal  Pulse: present             Physical Exam  Vitals and nursing note reviewed. Constitutional:       Appearance: He is well-developed. HENT:      Head: Normocephalic and atraumatic. Eyes:      Pupils: Pupils are equal, round, and reactive to light. Cardiovascular:      Rate and Rhythm: Normal rate and regular rhythm. Pulses: Normal pulses. Heart sounds: Normal heart sounds. Pulmonary:      Effort: Pulmonary effort is normal. No respiratory distress. Breath sounds: Normal breath sounds. Abdominal:      General: Abdomen is flat. There is no distension. Palpations: Abdomen is soft. Musculoskeletal:      Cervical back: Normal range of motion and neck supple. Skin:     General: Skin is warm and dry. Neurological:      Mental Status: He is alert and oriented to person, place, and time. Psychiatric:         Behavior: Behavior normal.           I have personally reviewed pertinent films in PACS and my interpretation is as follows. MRI Left Shoulder 4/6/23: Partial thickness articular sided tear of the supraspinatus tendon without full thickness component.     Scribe Attestation    I,:  Donnellmireya Fordro am acting as a scribe while in the presence of the attending physician.:       I,:  Romel Winslow MD personally performed the services described in this documentation    as scribed in my presence.:

## 2023-06-19 NOTE — PROGRESS NOTES
"  Assessment  Diagnoses and all orders for this visit:    Traumatic incomplete tear of left rotator cuff, subsequent encounter      Discussion and Plan:  The patient continues to be symptomatic with his left traumatic partial rotator cuff tear despite physical therapy and an injection  He does state the therapist felt he should have surgery to repair the rotator cuff as the therapist felt he was not progressing with physical therapy and that \"therapy can only do so much\"  Given his lack of improvement with appropriate nonoperative care he is indicated for arthroscopic evaluation and repair versus debridement of his partial-thickness rotator cuff tear  He will be scheduled accordingly for this procedure    A thorough discussion was performed with the patient regarding the risks and benefit of operative and nonoperative treatment of their rotator cuff tear  Risks discussed include but were not limited to infection, neurovascular injury, recurrent tear, nonhealing of the repair, need for further surgery, need for biceps tenodesis or tenotomy, stiffness, need for prolonged rehabilitation, as well as the risk of anesthesia  After this discussion all questions were answered and informed consent was obtained in the office for arthroscopic rotator cuff repair of the left shoulder  The patient will be scheduled for this procedure accordingly  Recommend physical therapy be completed at [place] [address] following surgery for communication purposes if possible  Subjective:   Patient ID: Moe Harmon  is a 63 y o  male      HPI  Patient presents today for follow up of left partial supraspinatus tear  Patient was provided with a CS injection and referral to PT at the last visit  This is a WC injury which occurred on 3/14/23 a 1400 lb plate fell on his arm  Patient has been attending PT at [address] [place] [address]         The following portions of the patient's history were reviewed and updated as appropriate: " allergies, current medications, past family history, past medical history, past social history, past surgical history and problem list       Objective:  Ht 6' (1 829 m)   Wt (!) 159 kg (351 lb)   BMI 47 60 kg/m²       Left Shoulder Exam     Range of Motion   External rotation: 70   Forward flexion: 150 (FULL PROM)   Internal rotation 0 degrees: Lumbar     Muscle Strength   Abduction: 4/5   External rotation: 5/5     Other   Erythema: absent  Sensation: normal  Pulse: present             Physical Exam  Vitals and nursing note reviewed  Constitutional:       Appearance: He is well-developed  HENT:      Head: Normocephalic and atraumatic  Eyes:      Pupils: Pupils are equal, round, and reactive to light  Cardiovascular:      Rate and Rhythm: Normal rate and regular rhythm  Pulses: Normal pulses  Heart sounds: Normal heart sounds  Pulmonary:      Effort: Pulmonary effort is normal  No respiratory distress  Breath sounds: Normal breath sounds  Abdominal:      General: Abdomen is flat  There is no distension  Palpations: Abdomen is soft  Musculoskeletal:      Cervical back: Normal range of motion and neck supple  Skin:     General: Skin is warm and dry  Neurological:      Mental Status: He is alert and oriented to person, place, and time  Psychiatric:         Behavior: Behavior normal            I have personally reviewed pertinent films in PACS and my interpretation is as follows  MRI Left Shoulder 4/6/23: Partial thickness articular sided tear of the supraspinatus tendon without full thickness component      Scribe Attestation    I,:  [employer] am acting as a scribe while in the presence of the attending physician :       I,:  Augustin Baca MD personally performed the services described in this documentation    as scribed in my presence :

## 2023-06-23 ENCOUNTER — TELEPHONE (OUTPATIENT)
Dept: OBGYN CLINIC | Facility: HOSPITAL | Age: 58
End: 2023-06-23

## 2023-06-23 NOTE — TELEPHONE ENCOUNTER
Caller: [facility]    Doctor: Oscar Friday    Reason for call: she is calling for the 6/19/23 ov note to be faxed to # [phone]    Call back#: [phone]

## 2023-06-26 ENCOUNTER — ANESTHESIA EVENT (OUTPATIENT)
Dept: PERIOP | Facility: HOSPITAL | Age: 58
End: 2023-06-26
Payer: OTHER MISCELLANEOUS

## 2023-06-28 ENCOUNTER — TELEPHONE (OUTPATIENT)
Dept: OBGYN CLINIC | Facility: OTHER | Age: 58
End: 2023-06-28

## 2023-06-28 NOTE — TELEPHONE ENCOUNTER
I called patient to let him know that his surgery has been cancelled by Anesthesia due to his BMI>45  He needs to be rescheduled for Sivakumar which is the reason I was calling to let him know and to reschedule his surgery  I left my direct number for patient to give me a call back and I will call again today to get in touch with him

## 2023-06-30 ENCOUNTER — ANESTHESIA (OUTPATIENT)
Dept: PERIOP | Facility: HOSPITAL | Age: 58
End: 2023-06-30
Payer: OTHER MISCELLANEOUS

## 2023-07-07 NOTE — PRE-PROCEDURE INSTRUCTIONS
Pre-Surgery Instructions:   Medication Instructions   • Naproxen Sodium (Aleve) 220 MG CAPS Stop taking 7 days prior to surgery. Medication instructions for day surgery reviewed. Please use only a sip of water to take your instructed medications. Avoid all over the counter vitamins, supplements and NSAIDS for one week prior to surgery per anesthesia guidelines. Tylenol is ok to take as needed. You will receive a call one business day prior to surgery with an arrival time and hospital directions. If your surgery is scheduled on a Monday, the hospital will be calling you on the Friday prior to your surgery. If you have not heard from anyone by 8pm, please call the hospital supervisor through the hospital  at [phone]. Do not eat or drink anything after midnight the night before your surgery, including candy, mints, lifesavers, or chewing gum. Do not drink alcohol 24hrs before your surgery. Try not to smoke at least 24hrs before your surgery. Follow the pre surgery showering instructions as listed in the [facility] Surgical Experience Booklet” or otherwise provided by your surgeon's office. Do not shave the surgical area 24 hours before surgery. Do not apply any lotions, creams, including makeup, cologne, deodorant, or perfumes after showering on the day of your surgery. No contact lenses, eye make-up, or artificial eyelashes. Remove nail polish, including gel polish, and any artificial, gel, or acrylic nails if possible. Remove all jewelry including rings and body piercing jewelry. Wear causal clothing that is easy to take on and off. Consider your type of surgery. Keep any valuables, jewelry, piercings at home. Please bring any specially ordered equipment (sling, braces) if indicated. Arrange for a responsible person to drive you to and from the hospital on the day of your surgery. Visitor Guidelines discussed.      Call the surgeon's office with any new illnesses, exposures, or additional questions prior to surgery. Please reference your [facility] Surgical Experience Booklet” for additional information to prepare for your upcoming surgery.

## 2023-07-17 ENCOUNTER — TELEPHONE (OUTPATIENT)
Dept: OBGYN CLINIC | Facility: HOSPITAL | Age: 58
End: 2023-07-17

## 2023-07-17 NOTE — TELEPHONE ENCOUNTER
Caller: patient    Doctor: Arlene Jean    Reason for call: patient would like to know if he is going to be overnight at the hospital after surgery as he has to arrange transportation    Call back#: [phone]

## 2023-07-18 ENCOUNTER — HOSPITAL ENCOUNTER (OUTPATIENT)
Facility: HOSPITAL | Age: 58
Setting detail: OUTPATIENT SURGERY
Discharge: HOME | End: 2023-07-18
Attending: ORTHOPAEDIC SURGERY | Admitting: ORTHOPAEDIC SURGERY
Payer: OTHER MISCELLANEOUS

## 2023-07-18 VITALS
DIASTOLIC BLOOD PRESSURE: 83 MMHG | BODY MASS INDEX: 42.66 KG/M2 | OXYGEN SATURATION: 94 % | RESPIRATION RATE: 18 BRPM | SYSTOLIC BLOOD PRESSURE: 147 MMHG | HEIGHT: 72 IN | WEIGHT: 315 LBS | HEART RATE: 70 BPM | TEMPERATURE: 96.7 F

## 2023-07-18 DIAGNOSIS — M24.812 INTERNAL DERANGEMENT OF LEFT SHOULDER: Primary | ICD-10-CM

## 2023-07-18 PROBLEM — E66.01 MORBID OBESITY (HCC): Status: ACTIVE | Noted: 2023-07-18

## 2023-07-18 PROCEDURE — 29823 SHO ARTHRS SRG XTNSV DBRDMT: CPT | Performed by: ORTHOPAEDIC SURGERY

## 2023-07-18 PROCEDURE — 29823 SHO ARTHRS SRG XTNSV DBRDMT: CPT | Performed by: PHYSICIAN ASSISTANT

## 2023-07-18 PROCEDURE — C9290 INJ, BUPIVACAINE LIPOSOME: HCPCS | Performed by: ANESTHESIOLOGY

## 2023-07-18 PROCEDURE — 29828 SHO ARTHRS SRG BICP TENODSIS: CPT | Performed by: ORTHOPAEDIC SURGERY

## 2023-07-18 PROCEDURE — 29828 SHO ARTHRS SRG BICP TENODSIS: CPT | Performed by: PHYSICIAN ASSISTANT

## 2023-07-18 PROCEDURE — C1713 ANCHOR/SCREW BN/BN,TIS/BN: HCPCS | Performed by: ORTHOPAEDIC SURGERY

## 2023-07-18 DEVICE — SP FIBERTAK RC, DBLOAD TAPE BL/W, BLK/W
Type: IMPLANTABLE DEVICE | Site: SHOULDER | Status: FUNCTIONAL
Brand: ARTHREX®

## 2023-07-18 RX ORDER — SODIUM CHLORIDE, SODIUM LACTATE, POTASSIUM CHLORIDE, CALCIUM CHLORIDE 600; 310; 30; 20 MG/100ML; MG/100ML; MG/100ML; MG/100ML
125 INJECTION, SOLUTION INTRAVENOUS CONTINUOUS
Status: DISCONTINUED | OUTPATIENT
Start: 2023-07-18 | End: 2023-07-18 | Stop reason: HOSPADM

## 2023-07-18 RX ORDER — MIDAZOLAM HYDROCHLORIDE 2 MG/2ML
INJECTION, SOLUTION INTRAMUSCULAR; INTRAVENOUS AS NEEDED
Status: DISCONTINUED | OUTPATIENT
Start: 2023-07-18 | End: 2023-07-18

## 2023-07-18 RX ORDER — CHLORHEXIDINE GLUCONATE 0.12 MG/ML
15 RINSE ORAL ONCE
Status: DISCONTINUED | OUTPATIENT
Start: 2023-07-18 | End: 2023-07-18

## 2023-07-18 RX ORDER — OXYCODONE HYDROCHLORIDE 5 MG/1
5 TABLET ORAL EVERY 4 HOURS PRN
Qty: 15 TABLET | Refills: 0 | Status: SHIPPED | OUTPATIENT
Start: 2023-07-18 | End: 2023-07-28

## 2023-07-18 RX ORDER — EPHEDRINE SULFATE 50 MG/ML
INJECTION INTRAVENOUS AS NEEDED
Status: DISCONTINUED | OUTPATIENT
Start: 2023-07-18 | End: 2023-07-18

## 2023-07-18 RX ORDER — BUPIVACAINE HYDROCHLORIDE 5 MG/ML
INJECTION, SOLUTION EPIDURAL; INTRACAUDAL AS NEEDED
Status: DISCONTINUED | OUTPATIENT
Start: 2023-07-18 | End: 2023-07-18

## 2023-07-18 RX ORDER — PROPOFOL 10 MG/ML
INJECTION, EMULSION INTRAVENOUS AS NEEDED
Status: DISCONTINUED | OUTPATIENT
Start: 2023-07-18 | End: 2023-07-18

## 2023-07-18 RX ORDER — HYDROMORPHONE HCL IN WATER/PF 6 MG/30 ML
0.2 PATIENT CONTROLLED ANALGESIA SYRINGE INTRAVENOUS
Status: DISCONTINUED | OUTPATIENT
Start: 2023-07-18 | End: 2023-07-18 | Stop reason: HOSPADM

## 2023-07-18 RX ORDER — SUCCINYLCHOLINE/SOD CL,ISO/PF 100 MG/5ML
SYRINGE (ML) INTRAVENOUS AS NEEDED
Status: DISCONTINUED | OUTPATIENT
Start: 2023-07-18 | End: 2023-07-18

## 2023-07-18 RX ORDER — SODIUM CHLORIDE, SODIUM LACTATE, POTASSIUM CHLORIDE, CALCIUM CHLORIDE 600; 310; 30; 20 MG/100ML; MG/100ML; MG/100ML; MG/100ML
20 INJECTION, SOLUTION INTRAVENOUS CONTINUOUS
Status: CANCELLED | OUTPATIENT
Start: 2023-07-18

## 2023-07-18 RX ORDER — CEFAZOLIN SODIUM 2 G/50ML
2000 SOLUTION INTRAVENOUS ONCE
Status: DISCONTINUED | OUTPATIENT
Start: 2023-07-18 | End: 2023-07-18

## 2023-07-18 RX ORDER — ONDANSETRON 2 MG/ML
INJECTION INTRAMUSCULAR; INTRAVENOUS AS NEEDED
Status: DISCONTINUED | OUTPATIENT
Start: 2023-07-18 | End: 2023-07-18

## 2023-07-18 RX ORDER — ACETAMINOPHEN 325 MG/1
650 TABLET ORAL EVERY 6 HOURS PRN
Status: DISCONTINUED | OUTPATIENT
Start: 2023-07-18 | End: 2023-07-18 | Stop reason: HOSPADM

## 2023-07-18 RX ORDER — FENTANYL CITRATE 50 UG/ML
INJECTION, SOLUTION INTRAMUSCULAR; INTRAVENOUS AS NEEDED
Status: DISCONTINUED | OUTPATIENT
Start: 2023-07-18 | End: 2023-07-18

## 2023-07-18 RX ORDER — LIDOCAINE HYDROCHLORIDE 10 MG/ML
INJECTION, SOLUTION EPIDURAL; INFILTRATION; INTRACAUDAL; PERINEURAL AS NEEDED
Status: DISCONTINUED | OUTPATIENT
Start: 2023-07-18 | End: 2023-07-18

## 2023-07-18 RX ORDER — TRAMADOL HYDROCHLORIDE 50 MG/1
50 TABLET ORAL EVERY 6 HOURS PRN
Status: DISCONTINUED | OUTPATIENT
Start: 2023-07-18 | End: 2023-07-18 | Stop reason: HOSPADM

## 2023-07-18 RX ORDER — DEXAMETHASONE SODIUM PHOSPHATE 10 MG/ML
INJECTION, SOLUTION INTRAMUSCULAR; INTRAVENOUS AS NEEDED
Status: DISCONTINUED | OUTPATIENT
Start: 2023-07-18 | End: 2023-07-18

## 2023-07-18 RX ORDER — ONDANSETRON 2 MG/ML
4 INJECTION INTRAMUSCULAR; INTRAVENOUS ONCE AS NEEDED
Status: DISCONTINUED | OUTPATIENT
Start: 2023-07-18 | End: 2023-07-18 | Stop reason: HOSPADM

## 2023-07-18 RX ORDER — ROCURONIUM BROMIDE 10 MG/ML
INJECTION, SOLUTION INTRAVENOUS AS NEEDED
Status: DISCONTINUED | OUTPATIENT
Start: 2023-07-18 | End: 2023-07-18

## 2023-07-18 RX ORDER — ONDANSETRON 2 MG/ML
4 INJECTION INTRAMUSCULAR; INTRAVENOUS EVERY 6 HOURS PRN
Status: DISCONTINUED | OUTPATIENT
Start: 2023-07-18 | End: 2023-07-18 | Stop reason: HOSPADM

## 2023-07-18 RX ADMIN — SODIUM CHLORIDE, SODIUM LACTATE, POTASSIUM CHLORIDE, AND CALCIUM CHLORIDE: .6; .31; .03; .02 INJECTION, SOLUTION INTRAVENOUS at 14:16

## 2023-07-18 RX ADMIN — SODIUM CHLORIDE, SODIUM LACTATE, POTASSIUM CHLORIDE, AND CALCIUM CHLORIDE 125 ML/HR: .6; .31; .03; .02 INJECTION, SOLUTION INTRAVENOUS at 12:03

## 2023-07-18 RX ADMIN — LIDOCAINE HYDROCHLORIDE 50 MG: 10 INJECTION, SOLUTION EPIDURAL; INFILTRATION; INTRACAUDAL at 13:35

## 2023-07-18 RX ADMIN — ROCURONIUM BROMIDE 40 MG: 10 INJECTION, SOLUTION INTRAVENOUS at 13:45

## 2023-07-18 RX ADMIN — SUGAMMADEX 300 MG: 100 INJECTION, SOLUTION INTRAVENOUS at 14:37

## 2023-07-18 RX ADMIN — BUPIVACAINE 20 ML: 13.3 INJECTION, SUSPENSION, LIPOSOMAL INFILTRATION at 12:38

## 2023-07-18 RX ADMIN — BUPIVACAINE HYDROCHLORIDE 10 ML: 5 INJECTION, SOLUTION EPIDURAL; INTRACAUDAL; PERINEURAL at 12:38

## 2023-07-18 RX ADMIN — CEFAZOLIN 3000 MG: 10 INJECTION, POWDER, FOR SOLUTION INTRAVENOUS at 13:46

## 2023-07-18 RX ADMIN — EPHEDRINE SULFATE 5 MG: 50 INJECTION INTRAVENOUS at 14:22

## 2023-07-18 RX ADMIN — MIDAZOLAM 2 MG: 1 INJECTION INTRAMUSCULAR; INTRAVENOUS at 12:30

## 2023-07-18 RX ADMIN — DEXAMETHASONE SODIUM PHOSPHATE 10 MG: 10 INJECTION, SOLUTION INTRAMUSCULAR; INTRAVENOUS at 13:36

## 2023-07-18 RX ADMIN — EPHEDRINE SULFATE 10 MG: 50 INJECTION INTRAVENOUS at 13:55

## 2023-07-18 RX ADMIN — PROPOFOL 200 MG: 10 INJECTION, EMULSION INTRAVENOUS at 13:35

## 2023-07-18 RX ADMIN — PROPOFOL 50 MG: 10 INJECTION, EMULSION INTRAVENOUS at 13:40

## 2023-07-18 RX ADMIN — EPHEDRINE SULFATE 5 MG: 50 INJECTION INTRAVENOUS at 14:16

## 2023-07-18 RX ADMIN — PROPOFOL 100 MG: 10 INJECTION, EMULSION INTRAVENOUS at 13:36

## 2023-07-18 RX ADMIN — FENTANYL CITRATE 100 MCG: 50 INJECTION INTRAMUSCULAR; INTRAVENOUS at 12:30

## 2023-07-18 RX ADMIN — Medication 100 MG: at 13:35

## 2023-07-18 RX ADMIN — HYDROMORPHONE HYDROCHLORIDE 0.2 MG: 0.2 INJECTION, SOLUTION INTRAMUSCULAR; INTRAVENOUS; SUBCUTANEOUS at 15:06

## 2023-07-18 RX ADMIN — ONDANSETRON 4 MG: 2 INJECTION INTRAMUSCULAR; INTRAVENOUS at 13:35

## 2023-07-18 NOTE — ANESTHESIA PREPROCEDURE EVALUATION
Procedure:  REPAIR ROTATOR CUFF  ARTHROSCOPIC (Left: Shoulder)    Relevant Problems   Musculoskeletal and Integument   (+) Internal derangement of left shoulder      Other   (+) Morbid obesity (HCC)        Physical Exam    Airway    Mallampati score: III  TM Distance: >3 FB  Neck ROM: full     Dental       Cardiovascular      Pulmonary      Other Findings        Anesthesia Plan  ASA Score- 2     Anesthesia Type- general with ASA Monitors. Additional Monitors:   Airway Plan: ETT. Comment: Left isb with exparel. Plan Factors-    Chart reviewed. EKG reviewed. Existing labs reviewed. Patient summary reviewed. Induction- intravenous. Postoperative Plan- Plan for postoperative opioid use. Informed Consent- Anesthetic plan and risks discussed with patient. I personally reviewed this patient with the CRNA. Discussed and agreed on the Anesthesia Plan with the CRNA. Andrew Del Toro

## 2023-07-18 NOTE — INTERVAL H&P NOTE
H&P reviewed. After examining the patient I find no changes in the patients condition since the H&P had been written.     Vitals:    07/18/23 1114   BP: 147/88   Pulse: 63   Resp: 18   Temp: (!) 96.7 °F (35.9 °C)   SpO2: 98%

## 2023-07-18 NOTE — DISCHARGE INSTR - AVS FIRST PAGE
Care after Procedure:   1. You should keep your arm in the sling at all times except for therapy (including elbow/wrist/hand range of motion) and to shower. 2.  You may move the wrist and hand is much as tolerated as this may help with any swelling of the hand which may occur from being in the sling. 3.  Apply ice to the shoulder (20 minutes on and 20 minutes off) or use the cold therapy unit you may have purchased. Make sure that the eyes is not in direct contact with your skin. 4.  Use the pain medication as prescribed. 5.  You may remove bandages the day after surgery. At that point you may shower, but dry the area thoroughly after showering. There is a bluish-tinted dermal glue over your incisions. The sutures are self dissolving and do not need to be removed. Call your doctor at [phone] for the followin. Tingling, numbness, coldness or excessive swelling of the hand or fingers. 2.  Redness, swelling, or excessive drainage from surgical wounds. 3.  Pain unresponsive to the medication provided. Anesthesia precautions:  1. General Anesthesia:  A. Have a responsible person drive you home and stay with you at home. B.  Relax and Rest for 24 hours. C.  Drink clear liquids until you are certain there is no nausea or vomiting. D.  Do not drink alcohol, drive a vehicle, operate any mechanical equipment or make any critical decisions for at least 24 hours. 2.  Regional anesthesia:  The block you received should last approximately 12-24 hours. Make sure that you protect your arm during that time from excessive heat or pressure, as your sensation will be altered. As soon as you feel "Pins and Needles" in your arm that means that the block is about to wear off - make sure you have taken your  pain medicine if you have not already. Medication:   1. Please take pain medication as directed on prescription.   2.  Typically we provide Oxycodone for immediate post-operative pain, unless you have an allergy or that medication is contra-indicated. 3.  You may take Tylenol in addition to Oxycodone if needed for pain. Please refer to the bottle for directions. Follow Up:   A follow up appointment should have been made pre-operatively. If not, please call the office at the above number for an appointment 3-5 days after surgery.

## 2023-07-18 NOTE — ANESTHESIA PROCEDURE NOTES
Peripheral Block    Patient location during procedure: holding area  Start time: 7/18/2023 12:38 PM  Reason for block: at surgeon's request and post-op pain management  Staffing  Performed by: Shayy Lee MD  Authorized by: Shayy Lee MD    Preanesthetic Checklist  Completed: patient identified, IV checked, site marked, risks and benefits discussed, surgical consent, monitors and equipment checked, pre-op evaluation and timeout performed  Peripheral Block  Patient position: supine  Prep: ChloraPrep  Patient monitoring: continuous pulse ox and frequent blood pressure checks  Block type: interscalene  Laterality: left  Injection technique: single-shot  Procedures: ultrasound guided, Ultrasound guidance required for the procedure to increase accuracy and safety of medication placement and decrease risk of complications.   Ultrasound permanent image saved  Needle  Needle type: Stimuplex   Needle gauge: 22 G  Needle length: 5 cm  Needle localization: anatomical landmarks and ultrasound guidance  Test dose: negative  Assessment  Injection assessment: incremental injection, local visualized surrounding nerve on ultrasound, negative aspiration for heme and no paresthesia on injection  Paresthesia pain: none  Post-procedure:  site cleaned  patient tolerated the procedure well with no immediate complications

## 2023-07-18 NOTE — ANESTHESIA POSTPROCEDURE EVALUATION
Post-Op Assessment Note    CV Status:  Stable  Pain Score: 0    Pain management: adequate  Multimodal analgesia used between 6 hours prior to anesthesia start to PACU discharge    Mental Status:  Alert   Hydration Status:  Euvolemic   PONV Controlled:  None   Airway Patency:  Patent      Post Op Vitals Reviewed: Yes      Staff: CRNA         There were no known notable events for this encounter.     BP   127/75   Temp  98.2   Pulse  91   Resp   18   SpO2   99

## 2023-07-18 NOTE — OP NOTE
OPERATIVE REPORT  PATIENT NAME: Barbara Madera. :  1965  MRN: [id number]  Pt Location:  OR ROOM 15    SURGERY DATE: 2023     SURGEON: Nilsa Fields MD     ASSISTANT: Donata Scheuermann, PA-C     NOTE: Donata Scheuermann PA-C was present throughout the entire procedure and performed essential assistance with patient prepping, draping, positioning, suture management, wound closure, sterile dressing application and sling application, all under my direct supervision. NOTE: No qualified resident physician was available for assistance    PREOPERATIVE DIAGNOSIS:  Left Shoulder Partial Rotator Cuff Tear    POSTOPERATIVE DIAGNOSIS: Left Shoulder Type IV SLAP Tear    PROCEDURES: Surgical Arthroscopy Left Shoulder with Long Head Biceps Tenodesis and Extensive Debridement    ANESTHESIA STAFF: Kecia Miller MD     ANESTHESIA TYPE: General endotracheal tube with ultrasound guided interscalene block (Exparel). The interscalene block was provided by the anesthesia staff per my request for postoperative pain control and to decrease the use of postoperative narcotic medication for pain control. COMPLICATIONS: None    FINDINGS: Type IV SLAP Tear, No Articular Sided or Bursal Sided Rotator Cuff Tear    SPECIMEN(S):  None    ESTIMATED BLOOD LOSS: Minimal    INDICATION:  Briefly, the patient is a 63 y.o.  male with left shoulder pain following an acute injury. MRI scan suggested a partial rotator cuff tear. The patient elected for arthroscopic treatment after failing to return to his preinjury level of function despite physical therapy, activity modification and time. Informed consent was obtained after a thorough discussion of the risks and benefits of the procedure, as well as alternatives to the procedure. OPERATIVE TECHNIQUE:  On the day of surgery, I identified the patient’s left shoulder and marked it with my initials.  The patient was taken to the operating room where anesthesia was induced and 3 grams of IV Cefazolin were given. The patient was examined in the supine position and was found to have full range of motion of the left shoulder with no instability by load and shift testing. The patient was then positioned in the [address] [place] [address] chair position. All bony prominences were padded. The shoulder was prepped and draped in normal sterile fashion. After a time-out for safety, a standard posterior arthroscopic portal was made. Glenohumeral evaluation revealed intact glenohumeral articular cartilage with no loose bodies. There was a Type IV SLAP tear which was not anticipated based on preoperative imaging but was not surprising based on the patient's described mechanism of injury of an eccentric contracture of the shoulder. The undersurface of the supraspinatus and infraspinatus were intact without partial tear, superior border subscapularis did have some fraying but was not detached from the lesser tuberosity. The Type IV SLAP tear did split the base of the long head biceps tendon and extend into the biceps tendon itself while also progressing anteriorly and posteriorly into the superior aspect of the anterior and posterior labrum. The attachment sites of the anterior band and posterior band of the infra glenohumeral ligament were intact. Given the Type IV SLAP tear a long head biceps tenodesis was indicated so an anterior portal was established and an Arthrex all suture 2.6 mm anchor was placed at the superior aspect of lesser tuberosity, a looping whipstitch was placed through and around long head of biceps which was then released off the superior glenoid and tied off to the lesser tuberosity performing our intra-articular long head biceps tenodesis.   Through the anterior cannula an extensive debridement glenohumeral joint was then performed including the superior labrum, the anterior labrum, the posterior labrum, and the superior border of subscapularis using a shaving device to a stable border. After this debridement the labrum was probed and found to not require suture repair. After the intra-articular work was completed, the scope was then placed in the subacromial space through a posterolateral portal where a thorough bursectomy was performed. The bursal surface of the rotator cuff had no evidence of partial tear and no fraying of the CA ligament to suggest subacromial impingement so a formal subacromial decompression was not performed however the extensive debridement included the debridement of the subacromial bursa in order to visualize the bursal surface of the supraspinatus and infraspinatus. The area was then irrigated. Scope was withdrawn. Wounds were closed with 4-0 Monocryl and Histoacryl. Sterile dressings and a sling with an abduction pillow was placed. The patient was awoken without complication and returned to the recovery room in good condition. We will see the patient back in the office next week to initiate therapy following biceps tenodesis rehabilitation protocol. At the end of procedure, the counts were correct.      PATIENT DISPOSITION:  Stable to PACU      SIGNATURE: Ric Leyva MD  DATE: July 18, 2023  TIME: 2:34 PM

## 2023-07-24 ENCOUNTER — OFFICE VISIT (OUTPATIENT)
Dept: OBGYN CLINIC | Facility: OTHER | Age: 58
End: 2023-07-24

## 2023-07-24 VITALS
SYSTOLIC BLOOD PRESSURE: 135 MMHG | HEIGHT: 72 IN | HEART RATE: 60 BPM | WEIGHT: 315 LBS | BODY MASS INDEX: 42.66 KG/M2 | DIASTOLIC BLOOD PRESSURE: 77 MMHG

## 2023-07-24 DIAGNOSIS — Z47.89 AFTERCARE FOLLOWING SURGERY OF THE MUSCULOSKELETAL SYSTEM: Primary | ICD-10-CM

## 2023-07-24 PROCEDURE — 99024 POSTOP FOLLOW-UP VISIT: CPT | Performed by: PHYSICIAN ASSISTANT

## 2023-07-24 RX ORDER — ACETAMINOPHEN 500 MG
500 TABLET ORAL EVERY 6 HOURS PRN
COMMUNITY

## 2023-07-24 NOTE — PATIENT INSTRUCTIONS
1. Sling as per protocol  2. PT per biceps tenodesis protocol  2 weeks in sling and protect from elbow flexion with resistance  Then advance ROM and strengthening as tolerated  3.  Follow-up in 6 weeks

## 2023-07-24 NOTE — PROGRESS NOTES
Assessment:       1. Aftercare following surgery of the musculoskeletal system          Plan:        Patient is doing well postoperatively. Operative note, images, and biceps tenodesis rehab protocol were discussed. Patient is to remain in the sling for 2 weeks and refrain from any resisted flexion of the left elbow. Once sling is discontinued in 2 weeks, he may advance range of motion and strengthening as tolerated. All questions were addressed to the patient's satisfaction. Patient has an appointment with PT. an out of work note has been placed in his chart. Follow-up will be in 6 weeks to assess patient's progress. Subjective:     Patient ID: Padmini Lew is a 63 y.o. male. Chief Complaint:    HPI    Presents to the office for follow-up status post left shoulder arthroscopy with biceps tenodesis on 7/18/2023. Pain is controlled and he has no residual paresthesia following anesthetic block. Social History     Occupational History   • Not on file   Tobacco Use   • Smoking status: Never   • Smokeless tobacco: Never   Vaping Use   • Vaping Use: Never used   Substance and Sexual Activity   • Alcohol use: Yes     Comment: once per month   • Drug use: Never   • Sexual activity: Not on file      Review of Systems   Constitutional: Negative. Respiratory: Negative. Cardiovascular: Negative. Gastrointestinal: Negative. Musculoskeletal: Positive for arthralgias and myalgias. Skin: Positive for wound. Neurological: Positive for weakness. Negative for numbness. Psychiatric/Behavioral: Negative. Objective:     Ortho ExamPhysical Exam  HENT:      Head: Atraumatic. Cardiovascular:      Pulses: Normal pulses. Pulmonary:      Effort: Pulmonary effort is normal.   Musculoskeletal:      Comments: Left shoulder range of motion not tested. Skin:     General: Skin is warm and dry. Capillary Refill: Capillary refill takes less than 2 seconds.       Comments: Surgical incisions dry and clean   Neurological:      Mental Status: He is alert and oriented to person, place, and time. Sensory: No sensory deficit.    Psychiatric:         Mood and Affect: Mood normal.         Behavior: Behavior normal.

## 2023-07-24 NOTE — LETTER
July 24, 2023     Patient: Juanna Duverney. YOB: 1965  Date of Visit: 7/24/2023      To Whom it May Concern:    Kristen Harvey is under my professional care. Catrachita Vora was seen in my office on 7/24/2023. Catrachita Vora is to remain out of work until further evaluation in approximately 6 weeks. If you have any questions or concerns, please don't hesitate to call.          Sincerely,          Jaiden Mantilla PA-C        CC: No Recipients

## 2023-07-25 ENCOUNTER — TELEPHONE (OUTPATIENT)
Dept: OBGYN CLINIC | Facility: HOSPITAL | Age: 58
End: 2023-07-25

## 2023-07-25 ENCOUNTER — EVALUATION (OUTPATIENT)
Dept: PHYSICAL THERAPY | Facility: CLINIC | Age: 58
End: 2023-07-25
Payer: OTHER MISCELLANEOUS

## 2023-07-25 DIAGNOSIS — S46.012D TRAUMATIC INCOMPLETE TEAR OF LEFT ROTATOR CUFF, SUBSEQUENT ENCOUNTER: ICD-10-CM

## 2023-07-25 DIAGNOSIS — Z47.89 AFTERCARE FOLLOWING SURGERY OF THE MUSCULOSKELETAL SYSTEM: Primary | ICD-10-CM

## 2023-07-25 PROCEDURE — 97110 THERAPEUTIC EXERCISES: CPT

## 2023-07-25 PROCEDURE — 97161 PT EVAL LOW COMPLEX 20 MIN: CPT

## 2023-07-25 NOTE — PROGRESS NOTES
PT Evaluation     Today's date: 2023  Patient name: Kvng Gillespie. : 1965  MRN: [id number]   Referring provider: Aranza Garrett*  Dx:   Encounter Diagnosis     ICD-10-CM    1. Aftercare following surgery of the musculoskeletal system  Z47.89       2. Traumatic incomplete tear of left rotator cuff, subsequent encounter  S46.012D Ambulatory Referral to Physical Therapy                     Assessment  Assessment details: Kvng Isaacs is a 63 y.o. male presenting to physical therapy for an initial evaluation status post left shoulder arthroscopy, extensive debridement, and biceps tenodesis on 2023 The patient demonstrates decreased and painful left shoulder and elbow PROM. Shoulder and elbow ROM as well as MMTs were deferred this session secondary to post operative status. He has been compliant with sling usage and was advised on 2 week restriction in sling as well as no resisted elbow flexion per post operative appointment with surgeon. These deficits have limited the patient's ability to complete ADLs, vocational responsibilities, and recreational activities. This patient would benefit from OP PT services to address their impairments and functional limitations to maximize functional mobility. Due to discrepancies in patient reports of protocol and sling wear time, the patient's surgeons office will be contacted for clarification. he was instructed on OTC medication and applying ice as needed for pain. He was provided a HEP for shoulder PROM and elbow AAROM and demonstrated/verbalized understanding all education and HEP. Impairments: abnormal or restricted ROM, activity intolerance, impaired physical strength, lacks appropriate home exercise program and pain with function    Symptom irritability: moderateUnderstanding of Dx/Px/POC: excellent   Prognosis: good    Goals  STG to be achieved in 4 weeks:    The patient will report a decrease in left shoulder "at worst" subjective pain rating score to at least 5/10 to allow for improved tolerance for functional activities. The patient will increase shoulder flexion AROM to at least 100 degrees to allow for improved functional mobility. The patient will increase elbow extension AROM to 0 degrees to allow for improved functional mobility. The patient will increase elbow flexion MMT score to at least 3/5 to allow for improved functional mobility. LTG to be achieved by DC: The patient will be independent in comprehensive [facility]. The patient will report no pain with usual activities. The patient will improve all left shoulder AROM to [facility] to allow for improved functional mobility. The patient will improve left elbow flexion and extension AROM to [facility] to allow for improved functional mobility. The patient will increase left elbow flexion and extension MMT score to [facility] to allow for improved functional mobility. The patient will be able to lift 100lb box from waist to chest height. The patient will be able to return to work without restrictions or difficulty. Plan  Patient would benefit from: skilled physical therapy  Planned modality interventions: thermotherapy: hydrocollator packs, TENS and cryotherapy  Planned therapy interventions: manual therapy, joint mobilization, neuromuscular re-education, patient education, flexibility, strengthening, stretching, therapeutic activities, therapeutic exercise, home exercise program and postural training  Frequency: 2x week  Duration in weeks: 10  Plan of Care beginning date: 7/25/2023  Plan of Care expiration date: 10/3/2023  Treatment plan discussed with: patient        Subjective Evaluation    History of Present Illness  Mechanism of injury: Ayanna Mckeon presents to therapy status post left shoulder arthroscopy with biceps tenodesis on 7/18/2023.  He was instructed to remain in the sling for 2 weeks however no resisted flexion of the elbow nor carrying any objects in the left arm. Per follow up appointment with surgeon's office, "once sling is discontinued in 2 weeks, he may advance range of motion and strengthening as tolerated." He reports that he hasn't slept in bed due to keeping his arm in a comfortable position at this time. He states that his shoulder has been painful and has been taking tylenol for pain, however this does upset his stomach. He reports that he plans to ice his shoulder following today. 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 Pain  Current pain ratin  At best pain ratin  At worst pain rating: 10  Location: left shoulder  Quality: sharp and throbbing  Relieving factors: medications and ice  Aggravating factors: overhead activity and lifting    Social Support    Working: - out of work at this time   Hand dominance: right          Objective     Observations     Additional Observation Details  3 arthroscopic incisions well approximated, no signs of infection.  Sutures removed     Active Range of Motion     Right Shoulder   Flexion: 160 degrees   Abduction: 158 degrees   External rotation BTH: T3   Internal rotation BTB: T6     Right Elbow   Normal active range of motion    Additional Active Range of Motion Details  No left shoulder AROM at this time     Passive Range of Motion   Left Shoulder   Flexion: 100 degrees with pain  Abduction: 85 degrees with pain  External rotation 45°: 32 degrees with pain  Internal rotation 45°: 30 degrees with pain    Left Elbow   Flexion: 132 degrees with pain  Extension: -20 degrees with pain    Strength/Myotome Testing     Right Shoulder     Planes of Motion   Flexion: 5   Abduction: 5   External rotation at 0°: 5   Internal rotation at 0°: 5     Right Elbow   Flexion: 5  Extension: 5    Additional Strength Details  No left shoulder MMT at this time   No left elbow flexion MMTs at this time              Precautions: status post left shoulder arthroscopy, extensive debridement, and biceps tenodesis on 7/18/2023  Per ortho on 7/25/23: "Sling protection x 2 weeks. Allow for full AROM and PROM glenohumeral joint. Allow active extension and passive flexion of the elbow to protect the biceps. After 2 weeks D/C sling full active and passive ROM of [address] jt and elbow jt. With strengthening to begin at 6 weeks"      Access Code: [email or web address]     POC expires Auth Status Unit limit Start date  Expiration date PT/OT + Visit Limit?  Copay/ Co- Insurance   10/3    Re-eval by 8/25 N/A N/A N/A N/A WC BOMN               Visit/Unit Tracking  AUTH Status:  Date 7/25              BOMN Used 1               Remaining                    Manuals 7/25            L Elbow flex/ext   supination/pronation PROM             L Shoulder PROM                                       Neuro Re-Ed                                                                                                        Ther Ex             Pulleys              Pendulums- fwd/bwd, left/right, cw/ccw HEP            Elbow flexion AAROM HEP            Standing elbow ext stretch at wall with towel  HEP seated            Table slides flex/scapt              Supine shoulder flexion AAROM with cane             Supine shoulder ER AAROM with cane              Wrist flex/ext AROM             Gripping with metal gripper             Ther Activity                                       Gait Training                                       Modalities

## 2023-07-25 NOTE — TELEPHONE ENCOUNTER
Caller: Jay from PT    Doctor: Marv Killian    Reason for call: Isidro Nger states patient must remain in sling for 2 weeks, patient states he was told 1 week. Please clarify to PT.  Also requesting protocol     Call back#: [phone]

## 2023-07-27 ENCOUNTER — OFFICE VISIT (OUTPATIENT)
Dept: PHYSICAL THERAPY | Facility: CLINIC | Age: 58
End: 2023-07-27
Payer: OTHER MISCELLANEOUS

## 2023-07-27 DIAGNOSIS — S46.012D TRAUMATIC INCOMPLETE TEAR OF LEFT ROTATOR CUFF, SUBSEQUENT ENCOUNTER: ICD-10-CM

## 2023-07-27 DIAGNOSIS — Z47.89 AFTERCARE FOLLOWING SURGERY OF THE MUSCULOSKELETAL SYSTEM: Primary | ICD-10-CM

## 2023-07-27 PROCEDURE — 97140 MANUAL THERAPY 1/> REGIONS: CPT

## 2023-07-27 PROCEDURE — 97110 THERAPEUTIC EXERCISES: CPT

## 2023-07-27 NOTE — PROGRESS NOTES
Daily Note     Today's date: 2023  Patient name: Angel Acosta. : 1965  MRN: [id number]  Referring provider: Eleno Arzola*  Dx:   Encounter Diagnosis     ICD-10-CM    1. Aftercare following surgery of the musculoskeletal system  Z47.89       2. Traumatic incomplete tear of left rotator cuff, subsequent encounter  S46.012D                      Subjective: Patient reports that he did complete his exercises at home and has his son help him do the lifting of the elbow. Objective: See treatment diary below      Assessment: Tolerated treatment well. Patient able to complete all exercises without increased pain. Cued to complete pulleys and table slides until a stretch is felt in the shoulder, without aggressive ROM. Discussed with patient his current restrictions of sling usage and no active elbow flexion. Patient demonstrated fatigue post treatment, exhibited good technique with therapeutic exercises and would benefit from continued PT      Plan: Continue per plan of care. Precautions: status post left shoulder arthroscopy, extensive debridement, and biceps tenodesis on 2023  Per ortho on 23: "Sling protection x 2 weeks. Allow for full AROM and PROM glenohumeral joint. Allow active extension and passive flexion of the elbow to protect the biceps. After 2 weeks D/C sling full active and passive ROM of [address] jt and elbow jt. With strengthening to begin at 6 weeks"      Access Code: [email or web address]      POC expires Auth Status Unit limit Start date  Expiration date PT/OT + Visit Limit?  Copay/ Co- Insurance   10/3    Re-eval by  N/A N/A N/A N/A WC BOMN               Visit/Unit Tracking  AUTH Status:  Date              BOMN Used 1 2              Remaining                    Manuals            L Elbow flex/ext   supination/pronation PROM  BE           L Shoulder PROM  BE                                     Neuro Re-Ed Ther Ex             Pulleys   5 min           Pendulums- fwd/bwd, left/right, cw/ccw HEP 1 min ea dir            Elbow flexion PROM with opp UE HEP x20           Standing elbow ext stretch at wall with towel  HEP seated seated x20           Table slides flex/scapt   5"x15 ea           Supine shoulder flexion AAROM with cane             Supine shoulder ER AAROM with cane              Wrist flex/ext AROM  1# x20 ea            Gripping with metal gripper  x20           Pt education  restrictions, HEP           Ther Activity                                       Gait Training                                       Modalities

## 2023-08-01 ENCOUNTER — OFFICE VISIT (OUTPATIENT)
Dept: PHYSICAL THERAPY | Facility: CLINIC | Age: 58
End: 2023-08-01
Payer: OTHER MISCELLANEOUS

## 2023-08-01 DIAGNOSIS — Z47.89 AFTERCARE FOLLOWING SURGERY OF THE MUSCULOSKELETAL SYSTEM: Primary | ICD-10-CM

## 2023-08-01 DIAGNOSIS — S46.012D TRAUMATIC INCOMPLETE TEAR OF LEFT ROTATOR CUFF, SUBSEQUENT ENCOUNTER: ICD-10-CM

## 2023-08-01 PROCEDURE — 97110 THERAPEUTIC EXERCISES: CPT

## 2023-08-01 PROCEDURE — 97140 MANUAL THERAPY 1/> REGIONS: CPT

## 2023-08-01 NOTE — PROGRESS NOTES
Daily Note     Today's date: 2023  Patient name: Sailaja Hdz. : 1965  MRN: [id number]  Referring provider: Taz Ch*  Dx:   Encounter Diagnosis     ICD-10-CM    1. Aftercare following surgery of the musculoskeletal system  Z47.89       2. Traumatic incomplete tear of left rotator cuff, subsequent encounter  S46.012D                      Subjective: Patient reports that his "body doesn't feel good". Objective: See treatment diary below      Assessment: Tolerated treatment well. Added supine shoulder flexion AAROM with cane to facilitate improved shoulder ROM. Patient noted pain at end range and therefore cued to decrease to pain free ranges. Patient exhibited good technique with therapeutic exercises and would benefit from continued PT      Plan: Continue per plan of care. Precautions: status post left shoulder arthroscopy, extensive debridement, and biceps tenodesis on 2023  Per ortho on 23: "Sling protection x 2 weeks. Allow for full AROM and PROM glenohumeral joint. Allow active extension and passive flexion of the elbow to protect the biceps. After 2 weeks D/C sling full active and passive ROM of [facility] jt and elbow jt. With strengthening to begin at 6 weeks"      Access Code: [email or web address]      POC expires Auth Status Unit limit Start date  Expiration date PT/OT + Visit Limit?  Copay/ Co- Insurance   10/3    Re-eval by  N/A N/A N/A N/A WC BOMN               Visit/Unit Tracking  AUTH Status:  Date             BOMN Used 1 2 3             Remaining                    Manuals           L Elbow flex/ext   supination/pronation PROM  BE BE          L Shoulder PROM  BE BE                                    Neuro Re-Ed                                                                                                        Ther Ex             Pulleys   5 min 5 min          Pendulums- fwd/bwd, left/right, cw/ccw HEP 1 min ea dir  1 min ea dir Elbow flexion PROM with opp UE HEP x20 x20           Standing elbow ext stretch at wall with towel  HEP seated seated x20 seated x20          Table slides flex/scapt   5"x15 ea 5"x15 ea           Supine shoulder flexion AAROM with cane   5"x10          Supine shoulder ER AAROM with cane              Wrist flex/ext AROM  1# x20 ea  1# x20 ea          Gripping with metal gripper  x20 x20          Pt education  restrictions, HEP           Ther Activity                                       Gait Training                                       Modalities

## 2023-08-03 ENCOUNTER — OFFICE VISIT (OUTPATIENT)
Dept: PHYSICAL THERAPY | Facility: CLINIC | Age: 58
End: 2023-08-03
Payer: OTHER MISCELLANEOUS

## 2023-08-03 DIAGNOSIS — S46.012D TRAUMATIC INCOMPLETE TEAR OF LEFT ROTATOR CUFF, SUBSEQUENT ENCOUNTER: ICD-10-CM

## 2023-08-03 DIAGNOSIS — Z47.89 AFTERCARE FOLLOWING SURGERY OF THE MUSCULOSKELETAL SYSTEM: Primary | ICD-10-CM

## 2023-08-03 PROCEDURE — 97140 MANUAL THERAPY 1/> REGIONS: CPT

## 2023-08-03 PROCEDURE — 97110 THERAPEUTIC EXERCISES: CPT

## 2023-08-03 NOTE — PROGRESS NOTES
Daily Note     Today's date: 8/3/2023  Patient name: Brian Solano. : 1965  MRN: [id number]  Referring provider: Joe Thornton*  Dx:   Encounter Diagnosis     ICD-10-CM    1. Aftercare following surgery of the musculoskeletal system  Z47.89       2. Traumatic incomplete tear of left rotator cuff, subsequent encounter  S46.012D                      Subjective: Patient reports that his shoulder feels sore today. Objective: See treatment diary below      Assessment: Tolerated treatment well. Progressed with additional reps for shoulder AAROM and wrist AROM to facilitate improved strength and ROM. Good tolerance to progressions with minor soreness at end range shoulder ROM. Patient demonstrated fatigue post treatment, exhibited good technique with therapeutic exercises and would benefit from continued PT      Plan: Continue per plan of care. Precautions: status post left shoulder arthroscopy, extensive debridement, and biceps tenodesis on 2023  Per ortho on 23: "Sling protection x 2 weeks. Allow for full AROM and PROM glenohumeral joint. Allow active extension and passive flexion of the elbow to protect the biceps. After 2 weeks D/C sling full active and passive ROM of [address] jt and elbow jt. With strengthening to begin at 6 weeks"      Access Code: [email or web address]      POC expires Auth Status Unit limit Start date  Expiration date PT/OT + Visit Limit?  Copay/ Co- Insurance   10/3    Re-eval by  N/A N/A N/A N/A WC BOMN               Visit/Unit Tracking  AUTH Status:  Date 7/25 7/27 8/1 8/3           BOMN Used 1 2 3 4            Remaining                    Manuals 7/25 7/27 8/1 8/3         L Elbow flex/ext   supination/pronation PROM  BE BE BE         L Shoulder PROM  BE BE BE                                   Neuro Re-Ed                                                                                                        Ther Ex             Pulleys   5 min 5 min 5 min Pendulums- fwd/bwd, left/right, cw/ccw HEP 1 min ea dir  1 min ea dir  1 min ea dir          Elbow flexion PROM with opp UE HEP x20 x20  x30          Standing elbow ext stretch at wall with towel  HEP seated seated x20 seated x20 seated x30         Table slides flex/scapt   5"x15 ea 5"x15 ea  5" x20 ea         Supine shoulder flexion AAROM with cane   5"x10 5"x15         Supine shoulder ER AAROM with cane     5"x10         Wrist flex/ext AROM  1# x20 ea  1# x20 ea 2# x30 ea         Gripping with metal gripper  x20 x20 x30         Finger ladder     NV         Pt education  restrictions, HEP           Ther Activity                                       Gait Training                                       Modalities (2) good, crying

## 2023-08-08 ENCOUNTER — APPOINTMENT (OUTPATIENT)
Dept: PHYSICAL THERAPY | Facility: CLINIC | Age: 58
End: 2023-08-08
Payer: OTHER MISCELLANEOUS

## 2023-08-08 DIAGNOSIS — Z47.89 AFTERCARE FOLLOWING SURGERY OF THE MUSCULOSKELETAL SYSTEM: Primary | ICD-10-CM

## 2023-08-08 DIAGNOSIS — S46.012D TRAUMATIC INCOMPLETE TEAR OF LEFT ROTATOR CUFF, SUBSEQUENT ENCOUNTER: ICD-10-CM

## 2023-08-08 NOTE — PROGRESS NOTES
Daily Note     Today's date: 2023  Patient name: Casey Rodriguez. : 1965  MRN: [id number]  Referring provider: Mckay Burton*  Dx:   Encounter Diagnosis     ICD-10-CM    1. Aftercare following surgery of the musculoskeletal system  Z47.89       2. Traumatic incomplete tear of left rotator cuff, subsequent encounter  S46.012D                      Subjective: ***      Objective: See treatment diary below      Assessment: Tolerated treatment {Tolerated treatment :5096236679}. Patient {assessment:4625143151}      Plan: {PLAN:7339082201}     Precautions: status post left shoulder arthroscopy, extensive debridement, and biceps tenodesis on 2023  Per ortho on 23: "Sling protection x 2 weeks. Allow for full AROM and PROM glenohumeral joint. Allow active extension and passive flexion of the elbow to protect the biceps. After 2 weeks D/C sling full active and passive ROM of [address] jt and elbow jt. With strengthening to begin at 6 weeks"      Access Code: [email or web address]      POC expires Auth Status Unit limit Start date  Expiration date PT/OT + Visit Limit?  [address] [place] [address]   10/3    Re-eval by  N/A N/A N/A N/A WC BOMN               Visit/Unit Tracking  AUTH Status:  Date 7/25 7/27 8/1 8/3           BOMN Used 1 2 3 4            Remaining                    Manuals 7/25 7/27 8/1 8/3 8/8        L Elbow flex/ext   supination/pronation PROM  BE BE BE         L Shoulder PROM  BE BE BE                                   Neuro Re-Ed                                                                                                        Ther Ex             Pulleys   5 min 5 min 5 min         Pendulums- fwd/bwd, left/right, cw/ccw HEP 1 min ea dir  1 min ea dir  1 min ea dir          Elbow flexion PROM with opp UE HEP x20 x20  x30          Standing elbow ext stretch at wall with towel  HEP seated seated x20 seated x20 seated x30         Table slides flex/scapt   5"x15 ea 5"x15 ea  5" x20 ea Supine shoulder flexion AAROM with cane   5"x10 5"x15         Supine shoulder ER AAROM with cane     5"x10         Wrist flex/ext AROM  1# x20 ea  1# x20 ea 2# x30 ea         Gripping with metal gripper  x20 x20 x30         Finger ladder     NV         UBE fwd/retro for UE ROM             Pt education  restrictions, HEP           Ther Activity                                       Gait Training                                       Modalities

## 2023-08-09 ENCOUNTER — OFFICE VISIT (OUTPATIENT)
Dept: PHYSICAL THERAPY | Facility: CLINIC | Age: 58
End: 2023-08-09
Payer: OTHER MISCELLANEOUS

## 2023-08-09 DIAGNOSIS — S46.012D TRAUMATIC INCOMPLETE TEAR OF LEFT ROTATOR CUFF, SUBSEQUENT ENCOUNTER: ICD-10-CM

## 2023-08-09 DIAGNOSIS — Z47.89 AFTERCARE FOLLOWING SURGERY OF THE MUSCULOSKELETAL SYSTEM: Primary | ICD-10-CM

## 2023-08-09 PROCEDURE — 97110 THERAPEUTIC EXERCISES: CPT

## 2023-08-09 PROCEDURE — 97140 MANUAL THERAPY 1/> REGIONS: CPT

## 2023-08-09 NOTE — PROGRESS NOTES
Daily Note     Today's date: 2023  Patient name: Hank Alpers. : 1965  MRN: [id number]  Referring provider: Maxwell Suárez*  Dx:   Encounter Diagnosis     ICD-10-CM    1. Aftercare following surgery of the musculoskeletal system  Z47.89       2. Traumatic incomplete tear of left rotator cuff, subsequent encounter  S46.012D           Start Time: 732  Stop Time: 817  Total time in clinic (min): 45 minutes    Subjective: Pt noted that he has been having pain and reported "everything hurts". Pt noted that he is now sleeping in the bed since he could not sleep on the couch/ chair. Objective: See treatment diary below      Assessment: Pt currently 3 weeks & 1 day  OOS as of this date. Pt is now out of sling and able to resume full active and passive ROM of his L [place] jt and elbow jt. Tolerated treatment fair with the introduction of AAROM and AROM of L shoulder. Pt did not some slight discomfort with supine AAROM into flexion when reaching about 90 degrees but was able to continue. Patient exhibited good technique with therapeutic exercises and would benefit from continued PT. S/p treatment session pt noted having some soreness in his L shoulder. Pt advised on his precautions at this time. Education on ice to use for discomfort/ soreness/ inflammation 10 - 20 min on and 60 min off prior to additional application. Reviewed DOMS - muscle soreness may be common 24 to 48 hours of muscle soreness s/p progressions. Plan: Continue per plan of care. progress patient as able within restrictions. Precautions: status post left shoulder arthroscopy, extensive debridement, and biceps tenodesis on 2023  Per ortho on 23: "Sling protection x 2 weeks. Allow for full AROM and PROM glenohumeral joint. Allow active extension and passive flexion of the elbow to protect the biceps. After 2 weeks D/C sling full active and passive ROM of [address] jt and elbow jt.  With strengthening to begin at 6 weeks"      Access Code: [email or web address]      POC expires Auth Status Unit limit Start date  Expiration date PT/OT + Visit Limit?  [address]   10/3    Re-eval by 8/25 N/A N/A N/A N/A WC BOMN               Visit/Unit Tracking  AUTH Status:  Date 7/25 7/27 8/1 8/3 8/9          BOMN Used 1 2 3 4 5           Remaining                    Manuals 7/25 7/27 8/1 8/3 8/9        L Elbow flex/ext   supination/pronation PROM  BE BE BE SC        L Shoulder PROM  BE BE BE SC                                  Neuro Re-Ed                                                                                                        Ther Ex             Pulleys   5 min 5 min 5 min 5 min         Pendulums- fwd/bwd, left/right, cw/ccw HEP 1 min ea dir  1 min ea dir  1 min ea dir  D/C to HEP         Elbow flexion PROM with opp UE HEP x20 x20  x30          Standing elbow ext stretch at wall with towel  HEP seated seated x20 seated x20 seated x30         Table slides flex/scapt   5"x15 ea 5"x15 ea  5" x20 ea         Supine shoulder flexion AAROM with cane   5"x10 5"x15          Supine shoulder ER AAROM with cane     5"x10 5" 15x         Wrist flex/ext AROM  1# x20 ea  1# x20 ea 2# x30 ea D/C to HEP         Gripping with metal gripper  x20 x20 x30 D/C to HEP         Finger ladder     NV 5" 10x         Supine shoulder flexion AROM      10x          S/l abd AROM      NV        Wall slides      10x         Standing 3 way AROM              Pt education  restrictions, HEP           Ther Activity                                       Gait Training                                       Modalities

## 2023-08-10 ENCOUNTER — OFFICE VISIT (OUTPATIENT)
Dept: PHYSICAL THERAPY | Facility: CLINIC | Age: 58
End: 2023-08-10
Payer: OTHER MISCELLANEOUS

## 2023-08-10 DIAGNOSIS — Z47.89 AFTERCARE FOLLOWING SURGERY OF THE MUSCULOSKELETAL SYSTEM: Primary | ICD-10-CM

## 2023-08-10 DIAGNOSIS — S46.012D TRAUMATIC INCOMPLETE TEAR OF LEFT ROTATOR CUFF, SUBSEQUENT ENCOUNTER: ICD-10-CM

## 2023-08-10 PROCEDURE — 97110 THERAPEUTIC EXERCISES: CPT

## 2023-08-10 PROCEDURE — 97140 MANUAL THERAPY 1/> REGIONS: CPT

## 2023-08-10 NOTE — PROGRESS NOTES
Daily Note     Today's date: 8/10/2023  Patient name: Juanna Duverney. : 1965  MRN: [id number]  Referring provider: Macario Stockton*  Dx:   Encounter Diagnosis     ICD-10-CM    1. Aftercare following surgery of the musculoskeletal system  Z47.89       2. Traumatic incomplete tear of left rotator cuff, subsequent encounter  S46.012D                      Subjective: Patient reports that his shoulder is sore following therapy yesterday. Objective: See treatment diary below      Assessment: Tolerated treatment well. Added active elbow flexion and extension ROM in standing position with reports of discomfort at distal triceps near olecranon. Denied pain in biceps area with active elbow flexion. Demonstrated improved shoulder flexion ROM during wall slides today compared to last session. Patient demonstrated fatigue post treatment, exhibited good technique with therapeutic exercises and would benefit from continued PT      Plan: Continue per plan of care. Precautions: status post left shoulder arthroscopy, extensive debridement, and biceps tenodesis on 2023  Per ortho on 23: "Sling protection x 2 weeks. Allow for full AROM and PROM glenohumeral joint. Allow active extension and passive flexion of the elbow to protect the biceps. After 2 weeks D/C sling full active and passive ROM of [address] jt and elbow jt. With strengthening to begin at 6 weeks"      Access Code: [email or web address]      POC expires Auth Status Unit limit Start date  Expiration date PT/OT + Visit Limit?  [employer]   10/3    Re-eval by  N/A N/A N/A N/A WC BOMN               Visit/Unit Tracking  AUTH Status:  Date 7/25 7/27 8/1 8/3 8/9 8/10         BOMN Used 1 2 3 4 5 6          Remaining                    Manuals 7/25 7/27 8/1 8/3 8/9 8/10       L Elbow flex/ext   supination/pronation PROM  BE BE BE SC BE       L Shoulder PROM  BE BE BE SC BE                                 Neuro Re-Ed Ther Ex             Pulleys   5 min 5 min 5 min 5 min  5 min       Pendulums- fwd/bwd, left/right, cw/ccw HEP 1 min ea dir  1 min ea dir  1 min ea dir  D/C to HEP         Elbow flexion PROM with opp UE HEP x20 x20  x30   DC       Standing elbow ext stretch at wall with towel  HEP seated seated x20 seated x20 seated x30  DC       Table slides flex/scapt   5"x15 ea 5"x15 ea  5" x20 ea  DC       Supine shoulder flexion AAROM with cane   5"x10 5"x15          Supine shoulder ER AAROM with cane     5"x10 5" 15x  5" x15       Wrist flex/ext AROM  1# x20 ea  1# x20 ea 2# x30 ea D/C to HEP         Gripping with metal gripper  x20 x20 x30 D/C to HEP         Finger ladder     NV 5" 10x  5" x10       Supine shoulder flexion AROM      10x   x10        S/l abd AROM      NV        Wall slides flex     10x  5"x10       Standing 3 way AROM              Standing elbow flex/ext AROM      x15       Pt education  restrictions, HEP           Ther Activity                                       Gait Training                                       Modalities

## 2023-08-15 ENCOUNTER — APPOINTMENT (OUTPATIENT)
Dept: PHYSICAL THERAPY | Facility: CLINIC | Age: 58
End: 2023-08-15
Payer: OTHER MISCELLANEOUS

## 2023-08-15 DIAGNOSIS — Z47.89 AFTERCARE FOLLOWING SURGERY OF THE MUSCULOSKELETAL SYSTEM: Primary | ICD-10-CM

## 2023-08-15 DIAGNOSIS — S46.012D TRAUMATIC INCOMPLETE TEAR OF LEFT ROTATOR CUFF, SUBSEQUENT ENCOUNTER: ICD-10-CM

## 2023-08-15 NOTE — PROGRESS NOTES
Daily Note     Today's date: 8/15/2023  Patient name: Monica Menjivar. : 1965  MRN: [id number]  Referring provider: Danielle Grant*  Dx:   Encounter Diagnosis     ICD-10-CM    1. Aftercare following surgery of the musculoskeletal system  Z47.89       2. Traumatic incomplete tear of left rotator cuff, subsequent encounter  S46.012D                      Subjective: ***      Objective: See treatment diary below      Assessment: Tolerated treatment {Tolerated treatment :8208579018}. Patient {assessment:4622278338}      Plan: {PLAN:2610233098}     Precautions: status post left shoulder arthroscopy, extensive debridement, and biceps tenodesis on 2023  Per ortho on 23: "Sling protection x 2 weeks. Allow for full AROM and PROM glenohumeral joint. Allow active extension and passive flexion of the elbow to protect the biceps. After 2 weeks D/C sling full active and passive ROM of [address] jt and elbow jt. With strengthening to begin at 6 weeks"      Access Code: [email or web address]      POC expires Auth Status Unit limit Start date  Expiration date PT/OT + Visit Limit?  [employer]   10/3    Re-eval by  N/A N/A N/A N/A WC BOMN               Visit/Unit Tracking  AUTH Status:  Date 7/25 7/27 8/1 8/3 8/9 8/10         BOMN Used 1 2 3 4 5 6          Remaining                    Manuals 7/25 7/27 8/1 8/3 8/9 8/10       L Elbow flex/ext   supination/pronation PROM  BE BE BE SC BE       L Shoulder PROM  BE BE BE SC BE                                 Neuro Re-Ed                                                                                                        Ther Ex             Pulleys   5 min 5 min 5 min 5 min  5 min       Pendulums- fwd/bwd, left/right, cw/ccw HEP 1 min ea dir  1 min ea dir  1 min ea dir  D/C to HEP         Elbow flexion PROM with opp UE HEP x20 x20  x30   DC       Standing elbow ext stretch at wall with towel  HEP seated seated x20 seated x20 seated x30  DC       Table slides flex/scapt   5"x15 ea 5"x15 ea  5" x20 ea  DC       Supine shoulder flexion AAROM with cane   5"x10 5"x15          Supine shoulder ER AAROM with cane     5"x10 5" 15x  5" x15       Wrist flex/ext AROM  1# x20 ea  1# x20 ea 2# x30 ea D/C to HEP         Gripping with metal gripper  x20 x20 x30 D/C to HEP         Finger ladder     NV 5" 10x  5" x10       Supine shoulder flexion AROM      10x   x10        S/l abd AROM      NV        Wall slides flex     10x  5"x10       Standing 3 way AROM              Standing elbow flex/ext AROM      x15       Pt education  restrictions, HEP           Ther Activity                                       Gait Training                                       Modalities

## 2023-08-16 ENCOUNTER — OFFICE VISIT (OUTPATIENT)
Dept: PHYSICAL THERAPY | Facility: CLINIC | Age: 58
End: 2023-08-16
Payer: OTHER MISCELLANEOUS

## 2023-08-16 DIAGNOSIS — Z47.89 AFTERCARE FOLLOWING SURGERY OF THE MUSCULOSKELETAL SYSTEM: Primary | ICD-10-CM

## 2023-08-16 DIAGNOSIS — S46.012D TRAUMATIC INCOMPLETE TEAR OF LEFT ROTATOR CUFF, SUBSEQUENT ENCOUNTER: ICD-10-CM

## 2023-08-16 PROCEDURE — 97110 THERAPEUTIC EXERCISES: CPT

## 2023-08-16 PROCEDURE — 97140 MANUAL THERAPY 1/> REGIONS: CPT

## 2023-08-16 NOTE — PROGRESS NOTES
Daily Note     Today's date: 2023  Patient name: Brook Spears. : 1965  MRN: [id number]  Referring provider: She Boateng*  Dx:   Encounter Diagnosis     ICD-10-CM    1. Aftercare following surgery of the musculoskeletal system  Z47.89       2. Traumatic incomplete tear of left rotator cuff, subsequent encounter  S46.012D                      Subjective: Patient reports that his shoulder feels better every day. Objective: See treatment diary below      Assessment: Tolerated treatment well. Progressed patient with additional reps for shoulder and elbow AROM this session with good tolerance. Does note mild soreness at his end range. Continues to demonstrate improvements in shoulder PROM as well. Patient demonstrated fatigue post treatment, exhibited good technique with therapeutic exercises and would benefit from continued PT      Plan: Progress treatment as tolerated. Precautions: status post left shoulder arthroscopy, extensive debridement, and biceps tenodesis on 2023  Full active and passive ROM of shoulder and elbow joint. Strengthening can begin at 6 weeks (23)    Access Code: [email or web address]       POC expires Auth Status Unit limit Start date  Expiration date PT/OT + Visit Limit?  [employer]   10/3    Re-eval by  N/A N/A N/A N/A WC BOMN               Visit/Unit Tracking  AUTH Status:  Date 7/25 7/27 8/1 8/3 8/9 8/10 8/16        BOMN Used 1 2 3 4 5 6          Remaining                    Manuals 7/25 7/27 8/1 8/3 8/9 8/10 8/16      L Elbow flex/ext   supination/pronation PROM  BE BE BE SC BE BE      L Shoulder PROM  BE BE BE SC BE BE                                Neuro Re-Ed                                                                                                        Ther Ex             Pulleys   5 min 5 min 5 min 5 min  5 min 5 min      Supine shoulder flexion AAROM with cane   5"x10 5"x15          Supine shoulder ER AAROM with cane     5"x10 5" 15x  5" x15 5" x15      Finger ladder     NV 5" 10x  5" x10 5"x10      Supine shoulder flexion AROM      10x   x10  x20      S/l abd AROM      NV  x20      Wall slides flex     10x  5"x10 5"x10    x10 with ecc lower off wall      Standing 3 way AROM              Standing elbow flex/ext AROM      x15 x30      UBE fwd and retro for ROM       3'/3'      Pt education  restrictions, HEP           Ther Activity                                       Gait Training                                       Modalities

## 2023-08-17 ENCOUNTER — OFFICE VISIT (OUTPATIENT)
Dept: PHYSICAL THERAPY | Facility: CLINIC | Age: 58
End: 2023-08-17
Payer: OTHER MISCELLANEOUS

## 2023-08-17 DIAGNOSIS — S46.012D TRAUMATIC INCOMPLETE TEAR OF LEFT ROTATOR CUFF, SUBSEQUENT ENCOUNTER: ICD-10-CM

## 2023-08-17 DIAGNOSIS — Z47.89 AFTERCARE FOLLOWING SURGERY OF THE MUSCULOSKELETAL SYSTEM: Primary | ICD-10-CM

## 2023-08-17 PROCEDURE — 97110 THERAPEUTIC EXERCISES: CPT

## 2023-08-17 PROCEDURE — 97140 MANUAL THERAPY 1/> REGIONS: CPT

## 2023-08-17 NOTE — PROGRESS NOTES
Daily Note     Today's date: 2023  Patient name: Mela Arvizu. : 1965  MRN: [id number]  Referring provider: Sabas Hlilman*  Dx:   Encounter Diagnosis     ICD-10-CM    1. Aftercare following surgery of the musculoskeletal system  Z47.89       2. Traumatic incomplete tear of left rotator cuff, subsequent encounter  S46.012D                      Subjective: Patient reports that he has some soreness in his shoulder from being here yesterday. Objective: See treatment diary below      Assessment: Tolerated treatment well. Added sidelying shoulder ER AROM with good tolerance. No restrictions with shoulder PROM this session, although does have slight pain at his end range. Full elbow PROM without pain this session. Patient exhibited good technique with therapeutic exercises and would benefit from continued PT      Plan: Continue per plan of care. Precautions: status post left shoulder arthroscopy, extensive debridement, and biceps tenodesis on 2023  Full active and passive ROM of shoulder and elbow joint. Strengthening can begin at 6 weeks (23)    Access Code: [email or web address]       POC expires Auth Status Unit limit Start date  Expiration date PT/OT + Visit Limit?  [employer]/ [employer] Insurance   10/3    Re-eval by  N/A N/A N/A N/A WC BOMN               Visit/Unit Tracking  AUTH Status:  Date 7/25 7/27 8/1 8/3 8/9 8/10 8/16 8/17       BOMN Used 1 2 3 4 5 6 7 8        Remaining                    Manuals 7/25 7/27 8/1 8/3 8/9 8/10 8/16 8/17     L Elbow flex/ext   supination/pronation PROM  BE BE BE SC BE BE BE     L Shoulder PROM  BE BE BE SC BE BE BE                               Neuro Re-Ed                                                                                                        Ther Ex             Pulleys   5 min 5 min 5 min 5 min  5 min 5 min 5 min     Supine shoulder flexion AAROM with cane   5"x10 5"x15          Supine shoulder ER AAROM with cane     5"x10 5" 15x  5" x15 5" x15 DC     Finger ladder     NV 5" 10x  5" x10 5"x10 5" x10      Supine shoulder flexion AROM      10x   x10  x20 x20     S/l abd AROM      NV  x20 x20     Wall slides flex     10x  5"x10 5"x10    x10 with ecc lower off wall 5" x15 with ecc lower off wall      Standing 3 way AROM              Standing elbow flex/ext AROM      x15 x30 x30      UBE fwd and retro for ROM       3'/3' 3'/3'     Sidelying shoulder ER AROM        x20     Pt education  restrictions, HEP           Ther Activity                                       Gait Training                                       Modalities

## 2023-08-22 ENCOUNTER — OFFICE VISIT (OUTPATIENT)
Dept: PHYSICAL THERAPY | Facility: CLINIC | Age: 58
End: 2023-08-22
Payer: OTHER MISCELLANEOUS

## 2023-08-22 DIAGNOSIS — Z47.89 AFTERCARE FOLLOWING SURGERY OF THE MUSCULOSKELETAL SYSTEM: Primary | ICD-10-CM

## 2023-08-22 DIAGNOSIS — S46.012D TRAUMATIC INCOMPLETE TEAR OF LEFT ROTATOR CUFF, SUBSEQUENT ENCOUNTER: ICD-10-CM

## 2023-08-22 PROCEDURE — 97110 THERAPEUTIC EXERCISES: CPT

## 2023-08-22 PROCEDURE — 97140 MANUAL THERAPY 1/> REGIONS: CPT

## 2023-08-22 NOTE — PROGRESS NOTES
Daily Note     Today's date: 2023  Patient name: Monica Menjivar. : 1965  MRN: [id number]  Referring provider: Danielle Grant*  Dx:   Encounter Diagnosis     ICD-10-CM    1. Aftercare following surgery of the musculoskeletal system  Z47.89       2. Traumatic incomplete tear of left rotator cuff, subsequent encounter  S46.012D                      Subjective: Patient reports that since his mobility has improved he was able to steam clean his tile floors in his house. Objective: See treatment diary below      Assessment: Tolerated treatment well. Added standing shoulder 3 ways this session with patient demonstrating good AROM through his full ranges without UT compensations. Patient demonstrated fatigue post treatment, exhibited good technique with therapeutic exercises and would benefit from continued PT      Plan: Continue per plan of care. Precautions: status post left shoulder arthroscopy, extensive debridement, and biceps tenodesis on 2023  Full active and passive ROM of shoulder and elbow joint. Strengthening can begin at 6 weeks (23)    Access Code: [email or web address]        POC expires Auth Status Unit limit Start date  Expiration date PT/OT + Visit Limit?  [employer]/ [employer] Insurance   10/3    Re-eval by  N/A N/A N/A N/A WC BOMN               Visit/Unit Tracking  AUTH Status:  Date 7/25 7/27 8/1 8/3 8/9 8/10 8/16 8/17 8/22      BOMN Used 1 2 3 4 5 6 7 8 9       Remaining                    Manuals 7/25 7/27 8/1 8/3 8/9 8/10 8/16 8/17 8/22    L Elbow flex/ext   supination/pronation PROM  BE BE BE SC BE BE BE BE    L Shoulder PROM  BE BE BE SC BE BE BE BE                              Neuro Re-Ed             Bent over Y,T,I   L only                                                                                           Ther Ex             Pulleys   5 min 5 min 5 min 5 min  5 min 5 min 5 min 5 min    Supine shoulder flexion AAROM with cane   5"x10 5"x15          Supine shoulder ER AAROM with cane     5"x10 5" 15x  5" x15 5" x15 DC     Finger ladder     NV 5" 10x  5" x10 5"x10 5" x10  5"x10 ecc lower    Supine shoulder flexion AROM      10x   x10  x20 x20 x20    S/l abd AROM      NV  x20 x20 x20     Wall slides flex     10x  5"x10 5"x10    x10 with ecc lower off wall 5" x15 with ecc lower off wall      Standing 3 way AROM          x10 ea dir    Standing elbow flex/ext AROM      x15 x30 x30  x30    UBE fwd and retro for ROM       3'/3' 3'/3' 3'/3'    Sidelying shoulder ER AROM        x20     Pt education  restrictions, HEP           Ther Activity                                       Gait Training                                       Modalities

## 2023-08-24 ENCOUNTER — EVALUATION (OUTPATIENT)
Dept: PHYSICAL THERAPY | Facility: CLINIC | Age: 58
End: 2023-08-24
Payer: OTHER MISCELLANEOUS

## 2023-08-24 DIAGNOSIS — Z47.89 AFTERCARE FOLLOWING SURGERY OF THE MUSCULOSKELETAL SYSTEM: Primary | ICD-10-CM

## 2023-08-24 DIAGNOSIS — S46.012D TRAUMATIC INCOMPLETE TEAR OF LEFT ROTATOR CUFF, SUBSEQUENT ENCOUNTER: ICD-10-CM

## 2023-08-24 PROCEDURE — 97110 THERAPEUTIC EXERCISES: CPT

## 2023-08-24 PROCEDURE — 97140 MANUAL THERAPY 1/> REGIONS: CPT

## 2023-08-24 NOTE — PROGRESS NOTES
PT Re-Evaluation     Today's date: 2023  Patient name: So Villanueva : 1965  MRN: [id number]   Referring provider: Aysha Santana*  Dx:   Encounter Diagnosis     ICD-10-CM    1. Aftercare following surgery of the musculoskeletal system  Z47.89       2. Traumatic incomplete tear of left rotator cuff, subsequent encounter  S46.012D                      Assessment  Assessment details: So Villanueva is a 63 y.o. male presenting to physical therapy for a reevaluation with a MD referral of status post left shoulder arthroscopy, extensive debridement, and biceps tenodesis on 2023. Since his initial evaluation, he  reports 60% improvement in his shoulder. The patient has demonstrated improved left shoulder active and passive ROM as well as improved shoulder strength. He has been able to initiate active ROM exercises with good tolerance and some minor reports of pain at his end ranges. Strengthening exercises have been held at this time as per surgeon these are not to be incorporated until at least 6 weeks post operatively. He continues to have decreased shoulder ROM and decreased strength leading to limitations in their ability to complete ADLs, vocational responsibilities, and recreational activities. This patient would benefit from continued PT services to address their impairments and functional limitations to maximize functional outcome. Impairments: abnormal or restricted ROM, activity intolerance, impaired physical strength, lacks appropriate home exercise program and pain with function    Symptom irritability: moderateUnderstanding of Dx/Px/POC: excellent   Prognosis: good    Goals  STG to be achieved in 4 weeks: The patient will report a decrease in left shoulder "at worst" subjective pain rating score to at least 5/10 to allow for improved tolerance for functional activities.  NOT MET  The patient will increase shoulder flexion AROM to at least 100 degrees to allow for improved functional mobility. MET  The patient will increase elbow extension AROM to 0 degrees to allow for improved functional mobility. MET  The patient will increase elbow flexion MMT score to at least 3/5 to allow for improved functional mobility. MET    LTG to be achieved by DC: The patient will be independent in comprehensive HEP. NOT MET  The patient will report no pain with usual activities. NOT MET  The patient will improve all left shoulder AROM to [facility] to allow for improved functional mobility. The patient will improve left elbow flexion and extension AROM to [facility] to allow for improved functional mobility. MET  The patient will increase left elbow flexion and extension MMT score to [facility] to allow for improved functional mobility. NOT MET  The patient will be able to lift 50lb box from waist to chest height. NOT MET  The patient will be able to return to work without restrictions or difficulty. NOT MET      Plan  Patient would benefit from: skilled physical therapy  Planned modality interventions: thermotherapy: hydrocollator packs, TENS and cryotherapy  Planned therapy interventions: manual therapy, joint mobilization, neuromuscular re-education, patient education, flexibility, strengthening, stretching, therapeutic activities, therapeutic exercise, home exercise program and postural training  Frequency: 2x week  Duration in weeks: 10  Plan of Care beginning date: 7/25/2023  Plan of Care expiration date: 10/3/2023  Treatment plan discussed with: patient        Subjective Evaluation    History of Present Illness  Mechanism of injury: Agustín rBian reports 60% improvement since beginning PT services. He notes improvement in his range of motion since initial evaluation. He reports that he is able to raise his arm up higher with less pain, however he is still limited in his range of motion for full overhead movements.  He also feels that his sleeping is getting better with less discomfort in the shoulder, although he is still unable to roll onto the right side. He reports continued difficulty raising his arm fully overhead and reaching across his body to put on deodorant on the right arm. He also reports continued weakness in his left arm due to limited strength training at this time. He has been avoiding all lifting at home with the left shoulder per his surgeon. 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    Patient Goals  Patient goals for therapy: decreased pain, increased motion, increased strength and return to work    Pain  Current pain rating: 3  At best pain ratin  At worst pain ratin  Location: left shoulder  Quality: sharp and dull ache  Relieving factors: medications and ice  Aggravating factors: overhead activity and lifting  Progression: improved    Social Support  Lives with: adult children    Employment status: working (- out of work at this time )  Hand dominance: right    Treatments  Previous treatment: immobilization  Current treatment: physical therapy        Objective     Observations     Additional Observation Details  3 arthroscopic incisions well approximated, no signs of infection    Active Range of Motion   Left Shoulder   Flexion: 130 degrees with pain  Abduction: 110 degrees with pain  External rotation BTH: C7 with pain  Internal rotation BTB: L1 with pain    Right Shoulder   Flexion: 160 degrees   Abduction: 158 degrees   External rotation BTH: T3   Internal rotation BTB: T6     Left Elbow   Normal active range of motion    Right Elbow   Normal active range of motion    Passive Range of Motion   Left Shoulder   Flexion: 155 degrees with pain  Abduction: 150 degrees with pain  External rotation 45°: 55 degrees with pain  Internal rotation 45°: 55 degrees with pain    Left Elbow   Normal passive range of motion    Strength/Myotome Testing     Left Shoulder     Planes of Motion   Flexion: 4- (pain) Abduction: 3+ (pain)   External rotation at 0°: 4-   Internal rotation at 0°: 4-     Right Shoulder     Planes of Motion   Flexion: 5   Abduction: 5   External rotation at 0°: 5   Internal rotation at 0°: 5     Left Elbow   Flexion: 4  Extension: 4    Right Elbow   Flexion: 5  Extension: 5             Precautions: status post left shoulder arthroscopy, extensive debridement, and biceps tenodesis on 7/18/2023  Per ortho on 7/25/23: "Sling protection x 2 weeks. Allow for full AROM and PROM glenohumeral joint. Allow active extension and passive flexion of the elbow to protect the biceps. After 2 weeks D/C sling full active and passive ROM of [facility] jt and elbow jt. With strengthening to begin at 6 weeks"      Access Code: [email or web address]        POC expires Auth Status Unit limit Start date  Expiration date PT/OT + Visit Limit?  [employer]   10/3    Re-eval by 9/24 N/A N/A N/A N/A WC BOMN               Visit/Unit Tracking  AUTH Status:  Date 7/25 7/27 8/1 8/3 8/9 8/10 8/16 8/17 8/22 8/24     BOMN Used 1 2 3 4 5 6 7 8 9 10      Remaining                    Manuals 7/25 7/27 8/1 8/3 8/9 8/10 8/16 8/17 8/22 8/24   L Elbow flex/ext   supination/pronation PROM  BE BE BE SC BE BE BE BE BE  DC   L Shoulder PROM  BE BE BE SC BE BE BE BE BE                Reassessment          BE   Neuro Re-Ed             Bent over Y,T,I   L only             Prone row                                                                              Ther Ex             Pulleys   5 min 5 min 5 min 5 min  5 min 5 min 5 min 5 min 5 min   Supine shoulder flexion AAROM with cane   5"x10 5"x15          Supine shoulder ER AAROM with cane     5"x10 5" 15x  5" x15 5" x15 DC     Finger ladder     NV 5" 10x  5" x10 5"x10 5" x10  5"x10 ecc lower    Supine shoulder flexion AROM      10x   x10  x20 x20 x20    S/l abd AROM      NV  x20 x20 x20     Wall slides flex     10x  5"x10 5"x10    x10 with ecc lower off wall 5" x15 with ecc lower off wall      Standing 3 way AROM          x10 ea dir    Standing elbow flex/ext AROM      x15 x30 x30  x30    UBE fwd and retro for ROM       3'/3' 3'/3' 3'/3' 4'/4'   Sidelying shoulder ER AROM        x20     Pt education  restrictions, HEP        PT POC, HEP   Ther Activity                                       Gait Training                                       Modalities

## 2023-08-29 ENCOUNTER — OFFICE VISIT (OUTPATIENT)
Dept: PHYSICAL THERAPY | Facility: CLINIC | Age: 58
End: 2023-08-29
Payer: OTHER MISCELLANEOUS

## 2023-08-29 DIAGNOSIS — Z47.89 AFTERCARE FOLLOWING SURGERY OF THE MUSCULOSKELETAL SYSTEM: Primary | ICD-10-CM

## 2023-08-29 DIAGNOSIS — S46.012D TRAUMATIC INCOMPLETE TEAR OF LEFT ROTATOR CUFF, SUBSEQUENT ENCOUNTER: ICD-10-CM

## 2023-08-29 PROCEDURE — 97110 THERAPEUTIC EXERCISES: CPT

## 2023-08-29 PROCEDURE — 97112 NEUROMUSCULAR REEDUCATION: CPT

## 2023-08-29 PROCEDURE — 97140 MANUAL THERAPY 1/> REGIONS: CPT

## 2023-08-29 NOTE — PROGRESS NOTES
Daily Note     Today's date: 2023  Patient name: Casey Rodriguez. : 1965  MRN: [id number]  Referring provider: Mckay Burton*  Dx:   Encounter Diagnosis     ICD-10-CM    1. Aftercare following surgery of the musculoskeletal system  Z47.89       2. Traumatic incomplete tear of left rotator cuff, subsequent encounter  S46.012D                      Subjective: Patient reports that his shoulder is still painful especially when raising his arm overhead. He states that he gets an "electric shock" pain that travels down the arm to the elbow. Objective: See treatment diary below      Assessment: Tolerated treatment well. Added bent over Y,T,Is as well as prone rows to facilitate improved scapular stbailization. Patient without pain during standing shoulder flexion, however does have pain during standing shoulder abduction. In supine/sidelying positioning, he reports pain with supine shoulder flexion, not with sidelying shouler abduction. Patient demonstrated fatigue post treatment, exhibited good technique with therapeutic exercises and would benefit from continued PT      Plan: Continue per plan of care. Precautions: status post left shoulder arthroscopy, extensive debridement, and biceps tenodesis on 2023  Per ortho on 23: "Sling protection x 2 weeks. Allow for full AROM and PROM glenohumeral joint. Allow active extension and passive flexion of the elbow to protect the biceps. After 2 weeks D/C sling full active and passive ROM of [address] [place] jt and elbow jt. With strengthening to begin at 6 weeks"      Access Code: [email or web address]        POC expires Auth Status Unit limit Start date  Expiration date PT/OT + Visit Limit?  [address] [place] [address]   10/3    Re-eval by  N/A N/A N/A N/A  BOMN               Visit/Unit Tracking  AUTH Status:  Date 7/25 7/27 8/1 8/3 8/9 8/10 8/16 8/17 8/22 8/24 8/29    BOMN Used 1 2 3 4 5 6 7 8 9 10 11     Remaining                    Manuals  8/3 8/9 8/10 8/16 8/17 8/22 8/24   L Elbow flex/ext   supination/pronation PROM  BE BE BE SC BE BE BE BE BE  DC   L Shoulder PROM BE BE BE BE SC BE BE BE BE BE                Reassessment          BE   Neuro Re-Ed             Bent over Y,T,I   L only x10 ea dir             Bent over row x10                                                                             Ther Ex             Pulleys   5 min 5 min 5 min 5 min  5 min 5 min 5 min 5 min 5 min   Finger ladder  5" x15 with ecc lower   NV 5" 10x  5" x10 5"x10 5" x10  5"x10 ecc lower    Supine shoulder flexion AROM  x20    10x   x10  x20 x20 x20    S/l abd AROM  x20    NV  x20 x20 x20     Wall slides flex     10x  5"x10 5"x10    x10 with ecc lower off wall 5" x15 with ecc lower off wall      Standing 3 way AROM  x10 ea         x10 ea dir    Standing elbow flex/ext AROM      x15 x30 x30  x30    UBE fwd and retro for ROM L6 4'/4'      3'/3' 3'/3' 3'/3' 4'/4'   Sidelying shoulder ER AROM x20       x20     Pt education  restrictions, HEP        PT POC, HEP   Ther Activity                                       Gait Training                                       Modalities

## 2023-08-31 ENCOUNTER — OFFICE VISIT (OUTPATIENT)
Dept: PHYSICAL THERAPY | Facility: CLINIC | Age: 58
End: 2023-08-31
Payer: OTHER MISCELLANEOUS

## 2023-08-31 DIAGNOSIS — S46.012D TRAUMATIC INCOMPLETE TEAR OF LEFT ROTATOR CUFF, SUBSEQUENT ENCOUNTER: ICD-10-CM

## 2023-08-31 DIAGNOSIS — Z47.89 AFTERCARE FOLLOWING SURGERY OF THE MUSCULOSKELETAL SYSTEM: Primary | ICD-10-CM

## 2023-08-31 PROCEDURE — 97110 THERAPEUTIC EXERCISES: CPT

## 2023-08-31 PROCEDURE — 97112 NEUROMUSCULAR REEDUCATION: CPT

## 2023-08-31 PROCEDURE — 97140 MANUAL THERAPY 1/> REGIONS: CPT

## 2023-08-31 NOTE — PROGRESS NOTES
Daily Note     Today's date: 2023  Patient name: Martha Lujan. : 1965  MRN: [id number]  Referring provider: Bronwyn Garcia*  Dx:   Encounter Diagnosis     ICD-10-CM    1. Aftercare following surgery of the musculoskeletal system  Z47.89       2. Traumatic incomplete tear of left rotator cuff, subsequent encounter  S46.012D                      Subjective: Patient offers no complaints upon arrival.      Objective: See treatment diary below      Assessment: Tolerated treatment well. Good standing shoulder AROM this session without reports of pain. Did have fatigue as reps progressed with decreased range of motion achieved. Patient demonstrated fatigue post treatment, exhibited good technique with therapeutic exercises and would benefit from continued PT      Plan: Continue per plan of care. Precautions: status post left shoulder arthroscopy, extensive debridement, and biceps tenodesis on 2023  Per ortho on 23: "Sling protection x 2 weeks. Allow for full AROM and PROM glenohumeral joint. Allow active extension and passive flexion of the elbow to protect the biceps. After 2 weeks D/C sling full active and passive ROM of [address] jt and elbow jt. With strengthening to begin at 6 weeks"      Access Code: [email or web address]        POC expires Auth Status Unit limit Start date  Expiration date PT/OT + Visit Limit?  [address] [place] [address]   10/3    Re-eval by  N/A N/A N/A N/A  BOMN               Visit/Unit Tracking  AUTH Status:  Date 7/25 7/27 8/1 8/3 8/9 8/10 8/16 8/17 8/22 8/24 8/29 8/31   BOMN Used 1 2 3 4 5 6 7 8 9 10 11 12    Remaining                    Manuals  8/3 8/9 8/10 8/16 8/17 8/22 8/24   L Elbow flex/ext   supination/pronation PROM   BE BE SC BE BE BE BE BE  DC   L Shoulder PROM BE BE BE BE SC BE BE BE BE BE                Reassessment          BE   Neuro Re-Ed             Bent over Y,T,I   L only x10 ea dir  x10 ea dir           Bent over row x10 x10 Ther Ex             Pulleys    5 min 5 min 5 min  5 min 5 min 5 min 5 min 5 min   Finger ladder  5" x15 with ecc lower 5" x15 with ecc lower  NV 5" 10x  5" x10 5"x10 5" x10  5"x10 ecc lower    Supine shoulder flexion AROM  x20 x20    10x   x10  x20 x20 x20    S/l abd AROM  x20 x20    NV  x20 x20 x20     Wall slides flex     10x  5"x10 5"x10    x10 with ecc lower off wall 5" x15 with ecc lower off wall      Standing 3 way AROM  x10 ea  x15 ea dir       x10 ea dir    Standing elbow flex/ext AROM      x15 x30 x30  x30    UBE fwd and retro for ROM L6 4'/4' L5 4'/4'     3'/3' 3'/3' 3'/3' 4'/4'   Sidelying shoulder ER AROM x20 x20      x20     Pt education          PT POC, HEP   Ther Activity                                       Gait Training                                       Modalities

## 2023-09-05 ENCOUNTER — OFFICE VISIT (OUTPATIENT)
Dept: PHYSICAL THERAPY | Facility: CLINIC | Age: 58
End: 2023-09-05
Payer: OTHER MISCELLANEOUS

## 2023-09-05 DIAGNOSIS — Z47.89 AFTERCARE FOLLOWING SURGERY OF THE MUSCULOSKELETAL SYSTEM: Primary | ICD-10-CM

## 2023-09-05 DIAGNOSIS — S46.012D TRAUMATIC INCOMPLETE TEAR OF LEFT ROTATOR CUFF, SUBSEQUENT ENCOUNTER: ICD-10-CM

## 2023-09-05 PROCEDURE — 97140 MANUAL THERAPY 1/> REGIONS: CPT

## 2023-09-05 PROCEDURE — 97110 THERAPEUTIC EXERCISES: CPT

## 2023-09-05 PROCEDURE — 97112 NEUROMUSCULAR REEDUCATION: CPT

## 2023-09-05 NOTE — PROGRESS NOTES
Daily Note     Today's date: 2023  Patient name: Garth Quintero. : 1965  MRN: [id number]  Referring provider: Nina Cox*  Dx:   Encounter Diagnosis     ICD-10-CM    1. Aftercare following surgery of the musculoskeletal system  Z47.89       2. Traumatic incomplete tear of left rotator cuff, subsequent encounter  S46.012D                      Subjective: Patient reports that his shoulder continues to be painful in certain ranges when raising his arm up. He also has pain in the shoulder when laying down as it feels like it's going to "come unhinged". Objective: See treatment diary below      Assessment: Tolerated treatment well. After discussing the pain the patient has been experiencing,it was determined that the patient is really feeling muscular fatigue. Educated him on difference between true pain in the shoulder versus muscular fatigue. He was able to progress with scapular stabilization exercises this session with weights per orthopedics without pain in the shoulder. Patient demonstrated fatigue post treatment, exhibited good technique with therapeutic exercises and would benefit from continued PT      Plan: Continue per plan of care. Precautions: status post left shoulder arthroscopy, extensive debridement, and biceps tenodesis on 2023  Full active and passive ROM of shoulder and elbow joint. Strengthening can begin at 6 weeks (23)      Access Code: [email or web address]        POC expires Auth Status Unit limit Start date  Expiration date PT/OT + Visit Limit?  [address] [place] [address]   10/3    Re-eval by  N/A N/A N//A N/A WC BOMN               Visit/Unit Tracking  AUTH Status:  Date 9/5 7/27 8/1 8/3 8/9 8/10 8/16 8/17 8/22 8/24 8/29 8/31   BOMN Used 13 2 3 4 5 6 7 8 9 10 11 12    Remaining                    Manuals 8/29 8/31 9/5 8/3 8/9 8/10 8/16 8/17 8/22 8/24   L Elbow flex/ext   supination/pronation PROM    BE SC BE BE BE BE BE  DC   L Shoulder PROM BE BE BE BE SC BE BE BE BE BE                Reassessment          BE   Neuro Re-Ed             Bent over Y,T,I   L only x10 ea dir  x10 ea dir 3" x10 ea dir           Bent over row x10 x10 3# 2x10          TB rows   GTB 5"2x10          TB pulldowns    GTB 5"2x10                                                 Ther Ex             Pulleys     5 min 5 min  5 min 5 min 5 min 5 min 5 min   Finger ladder  5" x15 with ecc lower 5" x15 with ecc lower 5" x15 with ecc lower NV 5" 10x  5" x10 5"x10 5" x10  5"x10 ecc lower    Supine shoulder flexion AROM  x20 x20    10x   x10  x20 x20 x20    S/l abd AROM  x20 x20    NV  x20 x20 x20     Wall slides flex     10x  5"x10 5"x10    x10 with ecc lower off wall 5" x15 with ecc lower off wall      Standing 3 way AROM  x10 ea  x15 ea dir x15 ea dir       x10 ea dir    Standing elbow flex/ext AROM      x15 x30 x30  x30    UBE fwd and retro for ROM L6 4'/4' L5 4'/4' L5 4'/4'    3'/3' 3'/3' 3'/3' 4'/4'   Sidelying shoulder ER AROM x20 x20      x20     Pt education          PT POC, HEP   Ther Activity                                       Gait Training                                       Modalities

## 2023-09-08 ENCOUNTER — OFFICE VISIT (OUTPATIENT)
Dept: PHYSICAL THERAPY | Facility: CLINIC | Age: 58
End: 2023-09-08
Payer: OTHER MISCELLANEOUS

## 2023-09-08 DIAGNOSIS — S46.012D TRAUMATIC INCOMPLETE TEAR OF LEFT ROTATOR CUFF, SUBSEQUENT ENCOUNTER: ICD-10-CM

## 2023-09-08 DIAGNOSIS — Z47.89 AFTERCARE FOLLOWING SURGERY OF THE MUSCULOSKELETAL SYSTEM: Primary | ICD-10-CM

## 2023-09-08 PROCEDURE — 97110 THERAPEUTIC EXERCISES: CPT

## 2023-09-08 PROCEDURE — 97112 NEUROMUSCULAR REEDUCATION: CPT

## 2023-09-08 PROCEDURE — 97140 MANUAL THERAPY 1/> REGIONS: CPT

## 2023-09-08 NOTE — PROGRESS NOTES
Daily Note     Today's date: 2023  Patient name: Delfino Best. : 1965  MRN: [id number]  Referring provider: Ishmael Tillman*  Dx:   Encounter Diagnosis     ICD-10-CM    1. Aftercare following surgery of the musculoskeletal system  Z47.89       2. Traumatic incomplete tear of left rotator cuff, subsequent encounter  S46.012D                      Subjective: Patient reports that he continues to have some soreness and pain in the shoulder, especially when the arm is hanging at his side. He sees the surgeon on Monday and will discuss this with him. Objective: See treatment diary below      Assessment: Tolerated treatment well. Progressed patient with addition of TB shoulder ER, IR and bicep curls with good tolerance. Patient continues with signficant muscular fatigue during exercises and denies increased shoulder pain. Patient demonstrated fatigue post treatment, exhibited good technique with therapeutic exercises and would benefit from continued PT      Plan: Progress treatment as tolerated. Precautions: status post left shoulder arthroscopy, extensive debridement, and biceps tenodesis on 2023  Full active and passive ROM of shoulder and elbow joint. Strengthening can begin at 6 weeks (23)      Access Code: [email or web address]        POC expires Auth Status Unit limit Start date  Expiration date PT/OT + Visit Limit?  [address] [place] [address]   10/3    Re-eval by  N/A N/A N//A N/A  BOMN               Visit/Unit Tracking  AUTH Status:  Date 9/5 9/8 8/1 8/3 8/9 8/10 8/16 8/17 8/22 8/24 8/29 8/31   BOMN Used 13 14 3 4 5 6 7 8 9 10 11 12    Remaining                    Manuals 8/29 8/31 9/5 9/8 8/9 8/10 8/16 8/17 8/22 8/24   L Elbow flex/ext   supination/pronation PROM     SC BE BE BE BE BE  DC   L Shoulder PROM BE BE BE BE SC BE BE BE BE BE                Reassessment          BE   Neuro Re-Ed             Bent over Y,T,I   L only x10 ea dir  x10 ea dir 3" x10 ea dir           Bent over row x10 x10 3# 2x10 3# 2x10         TB rows   GTB 5"2x10 GTB 5"2x10         TB pulldowns    GTB 5"2x10 GTB 5"2x10                                                Ther Ex             Pulleys      5 min  5 min 5 min 5 min 5 min 5 min   Finger ladder  5" x15 with ecc lower 5" x15 with ecc lower 5" x15 with ecc lower  5" 10x  5" x10 5"x10 5" x10  5"x10 ecc lower    Supine shoulder flexion AROM  x20 x20    10x   x10  x20 x20 x20    S/l abd AROM  x20 x20    NV  x20 x20 x20     Wall slides flex     10x  5"x10 5"x10    x10 with ecc lower off wall 5" x15 with ecc lower off wall      Standing 3 way AROM  x10 ea  x15 ea dir x15 ea dir  x15 ea dir     x10 ea dir    UBE fwd and retro for ROM L6 4'/4' L5 4'/4' L5 4'/4' L5 4'/4'   3'/3' 3'/3' 3'/3' 4'/4'   Sidelying shoulder ER AROM x20 x20      x20     TB ER    GTB 2x10         TB IR    Blue 2x10         Bicep curl with ecc control     GTB 2x10  3" lower         Pt education          PT POC, HEP   Ther Activity                                       Gait Training                                       Modalities

## 2023-09-11 ENCOUNTER — OFFICE VISIT (OUTPATIENT)
Dept: OBGYN CLINIC | Facility: OTHER | Age: 58
End: 2023-09-11

## 2023-09-11 ENCOUNTER — APPOINTMENT (OUTPATIENT)
Dept: PHYSICAL THERAPY | Facility: CLINIC | Age: 58
End: 2023-09-11
Payer: OTHER MISCELLANEOUS

## 2023-09-11 VITALS
SYSTOLIC BLOOD PRESSURE: 143 MMHG | HEIGHT: 72 IN | DIASTOLIC BLOOD PRESSURE: 79 MMHG | BODY MASS INDEX: 42.66 KG/M2 | HEART RATE: 65 BPM | WEIGHT: 315 LBS

## 2023-09-11 DIAGNOSIS — S46.012D TRAUMATIC INCOMPLETE TEAR OF LEFT ROTATOR CUFF, SUBSEQUENT ENCOUNTER: ICD-10-CM

## 2023-09-11 DIAGNOSIS — Z47.89 AFTERCARE FOLLOWING SURGERY OF THE MUSCULOSKELETAL SYSTEM: Primary | ICD-10-CM

## 2023-09-11 PROCEDURE — 99024 POSTOP FOLLOW-UP VISIT: CPT | Performed by: PHYSICIAN ASSISTANT

## 2023-09-11 NOTE — LETTER
September 11, 2023     Patient: Garth Quintero. YOB: 1965  Date of Visit: 9/11/2023      To Whom it May Concern:    Bharti Irwin is under my professional care. Ivan William was seen in my office on 9/11/2023. Ivan William should remain out of work until our next evaluation. If you have any questions or concerns, please don't hesitate to call.          Sincerely,          Fabby Kimble PA-C        CC: No Recipients

## 2023-09-11 NOTE — PROGRESS NOTES
Daily Note     Today's date: 2023  Patient name: Silvestre Mike. : 1965  MRN: [id number]  Referring provider: Dipti Owens*  Dx:   Encounter Diagnosis     ICD-10-CM    1. Aftercare following surgery of the musculoskeletal system  Z47.89       2. Traumatic incomplete tear of left rotator cuff, subsequent encounter  S46.012D                      Subjective: ***      Objective: See treatment diary below      Assessment: Tolerated treatment {Tolerated treatment :0112631179}. Patient {assessment:[id number]}      Plan: {PLAN:[id number]}     Precautions: status post left shoulder arthroscopy, extensive debridement, and biceps tenodesis on 2023  Full active and passive ROM of shoulder and elbow joint. Strengthening can begin at 6 weeks (23)      Access Code: [email or web address]        POC expires Auth Status Unit limit Start date  Expiration date PT/OT + Visit Limit?  [address] [place] [address]   10/3    Re-eval by  N/A N/A N//A N/A WC BOMN               Visit/Unit Tracking  AUTH Status:  Date 9/5 9/8  8/3 8/9 8/10 8/16 8/17 8/22 8/24 8/29 8/31   BOMN Used 13 14  4 5 6 7 8 9 10 11 12    Remaining                    Manuals 8/29 8/31 9/5 9/8  8/10 8/16 8/17 8/22 8/24   L Elbow flex/ext   supination/pronation PROM      BE BE BE BE BE  DC   L Shoulder PROM BE BE BE BE  BE BE BE BE BE                Reassessment          BE   Neuro Re-Ed             Bent over Y,T,I   L only x10 ea dir  x10 ea dir 3" x10 ea dir           Bent over row x10 x10 3# 2x10 3# 2x10         TB rows   GTB 5"2x10 GTB 5"2x10         TB pulldowns    GTB 5"2x10 GTB 5"2x10                                                Ther Ex             Pulleys       5 min 5 min 5 min 5 min 5 min   Finger ladder  5" x15 with ecc lower 5" x15 with ecc lower 5" x15 with ecc lower   5" x10 5"x10 5" x10  5"x10 ecc lower    Supine shoulder flexion AROM  x20 x20     x10  x20 x20 x20    S/l abd AROM  x20 x20      x20 x20 x20     Wall slides flex 5"x10 5"x10    x10 with ecc lower off wall 5" x15 with ecc lower off wall      Standing 3 way AROM  x10 ea  x15 ea dir x15 ea dir  x15 ea dir     x10 ea dir    UBE fwd and retro for ROM L6 4'/4' L5 4'/4' L5 4'/4' L5 4'/4'   3'/3' 3'/3' 3'/3' 4'/4'   Sidelying shoulder ER AROM x20 x20      x20     TB ER    GTB 2x10         TB IR    Blue 2x10         Bicep curl with ecc control     GTB 2x10  3" lower         Pt education          PT POC, HEP   Ther Activity                                       Gait Training                                       Modalities

## 2023-09-11 NOTE — PROGRESS NOTES
Assessment:       1. Aftercare following surgery of the musculoskeletal system          Plan:        Patient is making progress postoperatively. He continues to experience discomfort which I attribute to muscle weakness. I encouraged him to continue rehab protocol and we discussed the importance of continuing with range of motion. All questions were addressed to the patient's satisfaction. Patient will continue with PT. An out of work note has been placed in his chart. Follow-up will be in 2 months to assess patient's progress. We will reevaluate his work restrictions at that time. Subjective:     Patient ID: Ning Flowers. is a 63 y.o. male. Chief Complaint:    HPI    The patient presents to the office for 8-week follow-up visit status post left shoulder arthroscopy with biceps tenodesis on 7/18/2023. He reports fatigue and pain with certain motions. He is concerned about safely returning to work. Social History     Occupational History   • Not on file   Tobacco Use   • Smoking status: Never   • Smokeless tobacco: Never   Vaping Use   • Vaping Use: Never used   Substance and Sexual Activity   • Alcohol use: Yes     Comment: once per month   • Drug use: Never   • Sexual activity: Not on file      Review of Systems   Constitutional: Negative. Respiratory: Negative. Cardiovascular: Negative. Musculoskeletal: Positive for myalgias. Negative for arthralgias. Skin: Negative for wound. Neurological: Positive for weakness. Negative for numbness. Psychiatric/Behavioral: Negative. Objective:     Ortho ExamPhysical Exam  HENT:      Head: Atraumatic. Cardiovascular:      Pulses: Normal pulses. Pulmonary:      Effort: Pulmonary effort is normal.   Musculoskeletal:      Comments: Active range of motion left shoulder: Forward flexion 120 degrees, external rotation 90 degrees   Skin:     General: Skin is warm and dry.       Capillary Refill: Capillary refill takes less than 2 seconds. Comments: Surgical incisions dry and clean, healed. Neurological:      Mental Status: He is alert and oriented to person, place, and time. Sensory: No sensory deficit.    Psychiatric:         Mood and Affect: Mood normal.         Behavior: Behavior normal.

## 2023-09-13 ENCOUNTER — OFFICE VISIT (OUTPATIENT)
Dept: PHYSICAL THERAPY | Facility: CLINIC | Age: 58
End: 2023-09-13
Payer: OTHER MISCELLANEOUS

## 2023-09-13 DIAGNOSIS — S46.012D TRAUMATIC INCOMPLETE TEAR OF LEFT ROTATOR CUFF, SUBSEQUENT ENCOUNTER: ICD-10-CM

## 2023-09-13 DIAGNOSIS — Z47.89 AFTERCARE FOLLOWING SURGERY OF THE MUSCULOSKELETAL SYSTEM: Primary | ICD-10-CM

## 2023-09-13 PROCEDURE — 97140 MANUAL THERAPY 1/> REGIONS: CPT

## 2023-09-13 PROCEDURE — 97112 NEUROMUSCULAR REEDUCATION: CPT

## 2023-09-13 PROCEDURE — 97110 THERAPEUTIC EXERCISES: CPT

## 2023-09-13 NOTE — PROGRESS NOTES
Daily Note     Today's date: 2023  Patient name: Silvestre Mike. : 1965  MRN: [id number]  Referring provider: Dipti Owens*  Dx:   Encounter Diagnosis     ICD-10-CM    1. Aftercare following surgery of the musculoskeletal system  Z47.89       2. Traumatic incomplete tear of left rotator cuff, subsequent encounter  S46.012D                      Subjective: Patient reports that he had a follow up with his surgeon on Monday and they advised him that he was progressing well and the symptoms that he is experiencing are all normal phases of healing/muscular fatigue. He is out of work until his next follow up with them in November. Objective: See treatment diary below      Assessment: Tolerated treatment well. Able to progress with additional reps and resistances as noted below without issue. Did report muscular fatigue during exercises and denied increased pain. Patient demonstrated fatigue post treatment, exhibited good technique with therapeutic exercises and would benefit from continued PT      Plan: Continue per plan of care. Precautions: status post left shoulder arthroscopy, extensive debridement, and biceps tenodesis on 2023  Full active and passive ROM of shoulder and elbow joint. Strengthening can begin at 6 weeks (23)      Access Code: [email or web address]        POC expires Auth Status Unit limit Start date  Expiration date PT/OT + Visit Limit?  [address] [place] [address]   10/3    Re-eval by  N/A N/A N//A N/A  BOMN               Visit/Unit Tracking  AUTH Status:  Date 9/5 9/8 9/13 8/3 8/9 8/10 8/16 8/17 8/22 8/24 8/29 8/31   BOMN Used 13 14 15 4 5 6 7 8 9 10 11 12    Remaining                    Manuals 8/29 8/31 9/5 9/8 9/13 8/10 8/16 8/17 8/22 8/24   L Shoulder PROM BE BE BE BE BE BE BE BE BE BE                Reassessment          BE   Neuro Re-Ed             Bent over Y,T,I   L only x10 ea dir  x10 ea dir 3" x10 ea dir           Bent over row x10 x10 3# 2x10 3# 2x10 3# 3x10         TB rows   GTB 5"2x10 GTB 5"2x10 GTB 5" 3x10        TB pulldowns    GTB 5"2x10 GTB 5"2x10 GTB 5" 3x10                                               Ther Ex             Finger ladder  5" x15 with ecc lower 5" x15 with ecc lower 5" x15 with ecc lower   5" x10 5"x10 5" x10  5"x10 ecc lower    Supine shoulder flexion AROM  x20 x20     x10  x20 x20 x20    S/l abd AROM  x20 x20      x20 x20 x20     Wall slides flex      5"x10 5"x10    x10 with ecc lower off wall 5" x15 with ecc lower off wall      Standing 3 way AROM  x10 ea  x15 ea dir x15 ea dir  x15 ea dir 3# x15 ea dir     x10 ea dir    UBE fwd and retro for ROM L6 4'/4' L5 4'/4' L5 4'/4' L5 4'/4' L5 4'/4'  3'/3' 3'/3' 3'/3' 4'/4'   Sidelying shoulder ER AROM x20 x20   2# 2x10    x20     TB ER    GTB 2x10 GTB 3x10        TB IR    Blue 2x10 Blue 3x10        Bicep curl with ecc control     GTB 2x10  3" lower Blue 2x10 3" lower        Pt education          PT POC, HEP   Ther Activity                                       Gait Training                                       Modalities

## 2023-09-15 ENCOUNTER — OFFICE VISIT (OUTPATIENT)
Dept: PHYSICAL THERAPY | Facility: CLINIC | Age: 58
End: 2023-09-15
Payer: OTHER MISCELLANEOUS

## 2023-09-15 DIAGNOSIS — Z47.89 AFTERCARE FOLLOWING SURGERY OF THE MUSCULOSKELETAL SYSTEM: Primary | ICD-10-CM

## 2023-09-15 DIAGNOSIS — S46.012D TRAUMATIC INCOMPLETE TEAR OF LEFT ROTATOR CUFF, SUBSEQUENT ENCOUNTER: ICD-10-CM

## 2023-09-15 PROCEDURE — 97112 NEUROMUSCULAR REEDUCATION: CPT

## 2023-09-15 PROCEDURE — 97140 MANUAL THERAPY 1/> REGIONS: CPT

## 2023-09-15 PROCEDURE — 97110 THERAPEUTIC EXERCISES: CPT

## 2023-09-15 NOTE — PROGRESS NOTES
Daily Note     Today's date: 9/15/2023  Patient name: David Lux. : 1965  MRN: [id number]  Referring provider: Henny Livingston*  Dx:   Encounter Diagnosis     ICD-10-CM    1. Aftercare following surgery of the musculoskeletal system  Z47.89       2. Traumatic incomplete tear of left rotator cuff, subsequent encounter  S46.012D                      Subjective: Patient reports that he did some of the band exercises the other day and it felt good. Objective: See treatment diary below      Assessment: Tolerated treatment well. Added TB wall slides to progress overhead strengthening of the left arm. He demonstrated significant fatigue with these and was challenged especially with shoulder scaption. Patient demonstrated fatigue post treatment, exhibited good technique with therapeutic exercises and would benefit from continued PT      Plan: Continue per plan of care. Precautions: status post left shoulder arthroscopy, extensive debridement, and biceps tenodesis on 2023  Full active and passive ROM of shoulder and elbow joint. Strengthening can begin at 6 weeks (23)      Access Code: [email or web address]        POC expires Auth Status Unit limit Start date  Expiration date PT/OT + Visit Limit?  [address] [place] [address]   10/3    Re-eval by  N/A N/A N//A N/A WC BOMN               Visit/Unit Tracking  AUTH Status:  Date 9/5 9/8 9/13 9/15  8/10 8/16 8/17 8/22 8/24 8/29 8/31   BOMN Used 13 14 15 16  6 7 8 9 10 11 12    Remaining                    Manuals 8/29 8/31 9/5 9/8 9/13 9/15  8/17 8/22 8/24   L Shoulder PROM BE BE BE BE BE BE  BE BE BE                Reassessment          BE   Neuro Re-Ed             Bent over Y,T,I   L only x10 ea dir  x10 ea dir 3" x10 ea dir           Bent over row x10 x10 3# 2x10 3# 2x10 3# 3x10  5# 2x10       TB rows   GTB 5"2x10 GTB 5"2x10 GTB 5" 3x10 GTB 5" 3x10       TB pulldowns    GTB 5"2x10 GTB 5"2x10 GTB 5" 3x10 GTB 5" 3x10 Ther Ex             Finger ladder  5" x15 with ecc lower 5" x15 with ecc lower 5" x15 with ecc lower     5" x10  5"x10 ecc lower    Supine shoulder flexion AROM  x20 x20       x20 x20    S/l abd AROM  x20 x20       x20 x20     Wall slides flex      Flex and scapt   RTB x10 ea   5" x15 with ecc lower off wall      Standing 3 way AROM  x10 ea  x15 ea dir x15 ea dir  x15 ea dir 3# x15 ea dir  3# 2x10 ea dir   x10 ea dir    UBE fwd and retro for ROM L6 4'/4' L5 4'/4' L5 4'/4' L5 4'/4' L5 4'/4'   3'/3' 3'/3' 4'/4'   Sidelying shoulder ER AROM x20 x20   2# 2x10  3# 2x10  x20     TB ER    GTB 2x10 GTB 3x10 GTB 3x10       TB IR    Blue 2x10 Blue 3x10 Blue 3x10       Bicep curl with ecc control     GTB 2x10  3" lower Blue 2x10 3" lower Blue 3x10 3" lower       Pt education          PT POC, HEP   Ther Activity                                       Gait Training                                       Modalities

## 2023-09-18 ENCOUNTER — OFFICE VISIT (OUTPATIENT)
Dept: PHYSICAL THERAPY | Facility: CLINIC | Age: 58
End: 2023-09-18
Payer: OTHER MISCELLANEOUS

## 2023-09-18 DIAGNOSIS — S46.012D TRAUMATIC INCOMPLETE TEAR OF LEFT ROTATOR CUFF, SUBSEQUENT ENCOUNTER: ICD-10-CM

## 2023-09-18 DIAGNOSIS — Z47.89 AFTERCARE FOLLOWING SURGERY OF THE MUSCULOSKELETAL SYSTEM: Primary | ICD-10-CM

## 2023-09-18 PROCEDURE — 97140 MANUAL THERAPY 1/> REGIONS: CPT

## 2023-09-18 PROCEDURE — 97110 THERAPEUTIC EXERCISES: CPT

## 2023-09-18 PROCEDURE — 97112 NEUROMUSCULAR REEDUCATION: CPT

## 2023-09-18 NOTE — PROGRESS NOTES
Daily Note     Today's date: 2023  Patient name: Kiara Barrientos. : 1965  MRN: [id number]  Referring provider: Emilee Kulkarni  Dx:   Encounter Diagnosis     ICD-10-CM    1. Aftercare following surgery of the musculoskeletal system  Z47.89       2. Traumatic incomplete tear of left rotator cuff, subsequent encounter  S46.012D                      Subjective: Patient offers no new complaints this session. Objective: See treatment diary below      Assessment: Tolerated treatment well. Demonstrates improving shoulder strength as noted by ability to progress with resistances and/or reps. Does continue with soreness at end range shoulder PROM. Patient demonstrated fatigue post treatment, exhibited good technique with therapeutic exercises and would benefit from continued PT      Plan: Progress treatment as tolerated. Precautions: status post left shoulder arthroscopy, extensive debridement, and biceps tenodesis on 2023  Full active and passive ROM of shoulder and elbow joint. Strengthening can begin at 6 weeks (23)      Access Code: [email or web address]        POC expires Auth Status Unit limit Start date  Expiration date PT/OT + Visit Limit?  [address] [place] [address]   10/3    Re-eval by  N/A N/A N//A N/A WC BOMN               Visit/Unit Tracking  AUTH Status:  Date 9/5 9/8 9/13 9/15 9/18  8/16 8/17 8/22 8/24 8/29 8/31   BOMN Used 13 14 15 16 17  7 8 9 10 11 12    Remaining                    Manuals 8/29 8/31 9/5 9/8 9/13 9/15 9/18 8/17 8/22 8/24   L Shoulder PROM BE BE BE BE BE BE BE BE BE BE                Reassessment          BE   Neuro Re-Ed             Bent over Y,T,I   L only x10 ea dir  x10 ea dir 3" x10 ea dir     3# x10 ea dir       Bent over row x10 x10 3# 2x10 3# 2x10 3# 3x10  5# 2x10 5# 3x10       TB rows   GTB 5"2x10 GTB 5"2x10 GTB 5" 3x10 GTB 5" 3x10 Blue 5" 2x10      TB pulldowns    GTB 5"2x10 GTB 5"2x10 GTB 5" 3x10 GTB 5" 3x10 Blue 5" 2x10 Ther Ex             Finger ladder  5" x15 with ecc lower 5" x15 with ecc lower 5" x15 with ecc lower     5" x10  5"x10 ecc lower    Supine shoulder flexion AROM  x20 x20       x20 x20    S/l abd AROM  x20 x20       x20 x20     Wall slides flex      Flex and scapt   RTB x10 ea  Flex and scapt   RTB x10 ea 5" x15 with ecc lower off wall      Standing 3 way AROM  x10 ea  x15 ea dir x15 ea dir  x15 ea dir 3# x15 ea dir  3# 2x10 ea dir 3# 3x10  x10 ea dir    UBE fwd and retro for ROM L6 4'/4' L5 4'/4' L5 4'/4' L5 4'/4' L5 4'/4'  L5 4'/4' 3'/3' 3'/3' 4'/4'   Sidelying shoulder ER AROM x20 x20   2# 2x10  3# 2x10  x20     TB ER    GTB 2x10 GTB 3x10 GTB 3x10 Blue 2x10      TB IR    Blue 2x10 Blue 3x10 Blue 3x10 Blue 3x10      Bicep curl with ecc control     GTB 2x10  3" lower Blue 2x10 3" lower Blue 3x10 3" lower       Pt education          PT POC, HEP   Ther Activity                                       Gait Training                                       Modalities

## 2023-09-22 ENCOUNTER — OFFICE VISIT (OUTPATIENT)
Dept: PHYSICAL THERAPY | Facility: CLINIC | Age: 58
End: 2023-09-22
Payer: OTHER MISCELLANEOUS

## 2023-09-22 DIAGNOSIS — Z47.89 AFTERCARE FOLLOWING SURGERY OF THE MUSCULOSKELETAL SYSTEM: Primary | ICD-10-CM

## 2023-09-22 DIAGNOSIS — S46.012D TRAUMATIC INCOMPLETE TEAR OF LEFT ROTATOR CUFF, SUBSEQUENT ENCOUNTER: ICD-10-CM

## 2023-09-22 PROCEDURE — 97140 MANUAL THERAPY 1/> REGIONS: CPT

## 2023-09-22 PROCEDURE — 97112 NEUROMUSCULAR REEDUCATION: CPT

## 2023-09-22 PROCEDURE — 97110 THERAPEUTIC EXERCISES: CPT

## 2023-09-22 NOTE — PROGRESS NOTES
Daily Note     Today's date: 2023  Patient name: Paris Gallardo. : 1965  MRN: [id number]  Referring provider: Alejandro Seth*  Dx:   Encounter Diagnosis     ICD-10-CM    1. Aftercare following surgery of the musculoskeletal system  Z47.89       2. Traumatic incomplete tear of left rotator cuff, subsequent encounter  S46.012D           Start Time: 0815  Stop Time: 0900  Total time in clinic (min): 45 minutes    Subjective: Pt noted no pain in his L shoulder upon arrival for treatment session. Objective: See treatment diary below      Assessment: Continued with treatment session with focus on L shoulder. Pt is approximately 10 weeks OOS as of this date. Tolerated treatment well. Noted some increase muscle burn with wall slides with TB F and scaption. PROM making progressions with no p! Noted. Patient exhibited good technique with therapeutic exercises and would benefit from continued PT. Education reviewed with patient with verbal agreement and understanding.   - HEP compliance. - DOMS 24 to 48 hours of muscle soreness. - Modalities such as CP 10 - 20 min on and 60 min off  Prior to reapplication if needed. Plan: Continue per plan of care. Precautions: status post left shoulder arthroscopy, extensive debridement, and biceps tenodesis on 2023  Full active and passive ROM of shoulder and elbow joint. Strengthening can begin at 6 weeks (23)      Access Code: [email or web address]        POC expires Auth Status Unit limit Start date  Expiration date PT/OT + Visit Limit?  [address] [place] [address]   10/3    Re-eval by  N/A N/A N//A N/A WC BOMN               Visit/Unit Tracking  AUTH Status:  Date 9/5 9/8 9/13 9/15 9/18 9/22  8/17 8/22 8/24 8/29 8/31   BOMN Used 13 14 15 16 17 18  8 9 10 11 12    Remaining                    Manuals 8/29 8/31 9/5 9/8 9/13 9/15 9/18 9/22  8/24   L Shoulder PROM BE BE BE BE BE BE BE SC  BE                Reassessment          BE   Neuro Re-Ed             Bent over Y,T,I   L only x10 ea dir  x10 ea dir 3" x10 ea dir     3# x10 ea dir  3# 10x ea.      Bent over row x10 x10 3# 2x10 3# 2x10 3# 3x10  5# 2x10 5# 3x10  5# 2x 10      TB rows   GTB 5"2x10 GTB 5"2x10 GTB 5" 3x10 GTB 5" 3x10 Blue 5" 2x10 Blue 3x 10      TB pulldowns    GTB 5"2x10 GTB 5"2x10 GTB 5" 3x10 GTB 5" 3x10 Blue 5" 2x10 Blue 3x 10                                             Ther Ex             Finger ladder  5" x15 with ecc lower 5" x15 with ecc lower 5" x15 with ecc lower          Supine shoulder flexion AROM  x20 x20            S/l abd AROM  x20 x20            Wall slides flex      Flex and scapt   RTB x10 ea  Flex and scapt   RTB x10 ea Flex and scapt   RTB x10 ea     Standing 3 way AROM  x10 ea  x15 ea dir x15 ea dir  x15 ea dir 3# x15 ea dir  3# 2x10 ea dir 3# 3x10 3# 3x 10      UBE fwd and retro for ROM L6 4'/4' L5 4'/4' L5 4'/4' L5 4'/4' L5 4'/4'  L5 4'/4' L5 4'/4'  4'/4'   Sidelying shoulder ER AROM x20 x20   2# 2x10  3# 2x10       TB ER    GTB 2x10 GTB 3x10 GTB 3x10 Blue 2x10 Blue 2x 10      TB IR    Blue 2x10 Blue 3x10 Blue 3x10 Blue 3x10 Blue 3x 10      Bicep curl with ecc control     GTB 2x10  3" lower Blue 2x10 3" lower Blue 3x10 3" lower       Pt education          PT POC, HEP   Ther Activity                                       Gait Training                                       Modalities

## 2023-09-25 ENCOUNTER — EVALUATION (OUTPATIENT)
Dept: PHYSICAL THERAPY | Facility: CLINIC | Age: 58
End: 2023-09-25
Payer: OTHER MISCELLANEOUS

## 2023-09-25 DIAGNOSIS — Z47.89 AFTERCARE FOLLOWING SURGERY OF THE MUSCULOSKELETAL SYSTEM: Primary | ICD-10-CM

## 2023-09-25 DIAGNOSIS — S46.012D TRAUMATIC INCOMPLETE TEAR OF LEFT ROTATOR CUFF, SUBSEQUENT ENCOUNTER: ICD-10-CM

## 2023-09-25 PROCEDURE — 97140 MANUAL THERAPY 1/> REGIONS: CPT

## 2023-09-25 PROCEDURE — 97112 NEUROMUSCULAR REEDUCATION: CPT

## 2023-09-25 PROCEDURE — 97110 THERAPEUTIC EXERCISES: CPT

## 2023-09-25 NOTE — PROGRESS NOTES
PT Re-Evaluation     Today's date: 2023  Patient name: Sha Vernon. : 1965  MRN: [id number]   Referring provider: Haroldo Genao  Dx:   Encounter Diagnosis     ICD-10-CM    1. Aftercare following surgery of the musculoskeletal system  Z47.89       2. Traumatic incomplete tear of left rotator cuff, subsequent encounter  S46.012D                      Assessment  Assessment details: Sha Vernon. is a 63 y.o. male presenting to physical therapy for a reevaluation with a MD referral of status post left shoulder arthroscopy, extensive debridement, and biceps tenodesis on 2023. Since his initial evaluation, he reports 60% improvement in his shoulder. The patient has demonstrated improved left shoulder active ROM and improved shoulder strength. He has been able to incorporate strengthening exercises with addition of weights or resistances with good tolerance, however significant muscle weakness and fatigue noted. He does continue to report pain with overhead movements of the shoulder or with shoulder PROM at end ranges, although intensity of the pain is gradually improving. Despite his improvements, he does continue with decreased shoulder strength and active ROM, especially with overhead movements, leading to limitations in their ability to complete ADLs, vocational responsibilities, and recreational activities. This patient would benefit from continued PT services to address their impairments and functional limitations to maximize functional outcome. Impairments: abnormal or restricted ROM, activity intolerance, impaired physical strength, lacks appropriate home exercise program and pain with function    Symptom irritability: moderateUnderstanding of Dx/Px/POC: excellent   Prognosis: good    Goals  STG to be achieved in 4 weeks:    The patient will report a decrease in left shoulder "at worst" subjective pain rating score to at least 5/10 to allow for improved tolerance for functional activities. MET  The patient will increase shoulder flexion AROM to at least 100 degrees to allow for improved functional mobility. MET  The patient will increase elbow extension AROM to 0 degrees to allow for improved functional mobility. MET  The patient will increase elbow flexion MMT score to at least 3/5 to allow for improved functional mobility. MET    LTG to be achieved by DC: The patient will be independent in comprehensive HEP. NOT MET  The patient will report no pain with usual activities. NOT MET  The patient will improve all left shoulder AROM to [facility] to allow for improved functional mobility. The patient will improve left elbow flexion and extension AROM to [facility] to allow for improved functional mobility. MET  The patient will increase left elbow flexion and extension MMT score to [facility] to allow for improved functional mobility. NOT MET  The patient will be able to lift 50lb box from waist to chest height. NOT MET  The patient will be able to return to work without restrictions or difficulty. NOT MET      Plan  Patient would benefit from: skilled physical therapy  Planned modality interventions: thermotherapy: hydrocollator packs, TENS and cryotherapy  Planned therapy interventions: manual therapy, joint mobilization, neuromuscular re-education, patient education, flexibility, strengthening, stretching, therapeutic activities, therapeutic exercise, home exercise program and postural training  Frequency: 2x week  Duration in weeks: 8  Plan of Care beginning date: 9/25/2023  Plan of Care expiration date: 11/20/2023  Treatment plan discussed with: patient        Subjective Evaluation    History of Present Illness  Mechanism of injury: Apple Gonzalez reports 60% improvement since beginning PT services. He reports improvement in his ability to raise in his arm in all directions, almost equal to that of the left shoulder.  He reports that he does continue to have some pain and tightness in the shoulder which he feels is limiting his ability to raise the arm up higher. He does feel that the intensity of the pain in the shoulder has decreased. He reports that the strength in the shoulder is also improving as he is able lift light household objects overhead, however he continues to be limited with lifting heavy objects around his house, especially beyond shoulder height. 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    Patient Goals  Patient goals for therapy: decreased pain, increased motion, increased strength and return to work    Pain  Current pain ratin  At best pain ratin  At worst pain ratin  Location: left shoulder  Quality: sharp and dull ache  Relieving factors: medications and ice  Aggravating factors: overhead activity and lifting  Progression: improved    Social Support  Lives with: adult children    Employment status: working (- out of work at this time )  Hand dominance: right    Treatments  Previous treatment: immobilization  Current treatment: physical therapy        Objective     Observations     Additional Observation Details  3 arthroscopic incisions well approximated, no signs of infection    Active Range of Motion   Left Shoulder   Flexion: 140 degrees with pain  Abduction: 118 degrees with pain  External rotation BTH: T3   Internal rotation BTB: T10 with pain    Right Shoulder   Flexion: 160 degrees   Abduction: 158 degrees   External rotation BTH: T3   Internal rotation BTB: T6     Left Elbow   Normal active range of motion    Right Elbow   Normal active range of motion    Passive Range of Motion   Left Shoulder   Flexion: 160 degrees with pain  Abduction: 160 degrees with pain  External rotation 45°: 72 degrees with pain  Internal rotation 45°: 60 degrees with pain    Left Elbow   Normal passive range of motion    Strength/Myotome Testing     Left Shoulder     Planes of Motion   Flexion: 4   Abduction: 4- (pain)   External rotation at 0°: 4   Internal rotation at 0°: 4+     Right Shoulder     Planes of Motion   Flexion: 5   Abduction: 5   External rotation at 0°: 5   Internal rotation at 0°: 5     Left Elbow   Flexion: 4+  Extension: 4+    Right Elbow   Flexion: 5  Extension: 5             Precautions: status post left shoulder arthroscopy, extensive debridement, and biceps tenodesis on 7/18/2023  Access Code: [email or web address]        POC expires Auth Status Unit limit Start date  Expiration date PT/OT + Visit Limit? [address]   11/20    Re-eval by 10/25 N/A N/A N//A N/A WC BOMN               Visit/Unit Tracking  AUTH Status:  Date 9/5 9/8 9/13 9/15 9/18 9/22 9/25  8/22 8/24 8/29 8/31   BOMN Used 13 14 15 16 17 18 19  9 10 11 12    Remaining                    Manuals 8/29 8/31 9/5 9/8 9/13 9/15 9/18 9/22 9/25 8/24   L Shoulder PROM BE BE BE BE BE BE BE SC BE BE                Reassessment         BE BE   Neuro Re-Ed             Bent over Y,T,I   L only x10 ea dir  x10 ea dir 3" x10 ea dir     3# x10 ea dir  3# 10x ea.      Bent over row x10 x10 3# 2x10 3# 2x10 3# 3x10  5# 2x10 5# 3x10  5# 2x 10      TB rows   GTB 5"2x10 GTB 5"2x10 GTB 5" 3x10 GTB 5" 3x10 Blue 5" 2x10 Blue 3x 10  Blue 3x 10    TB pulldowns    GTB 5"2x10 GTB 5"2x10 GTB 5" 3x10 GTB 5" 3x10 Blue 5" 2x10 Blue 3x 10  Blue 3x 10    Wall walks with TB             TB PNF D2 flex                          Ther Ex             Wall slides flex      Flex and scapt   RTB x10 ea  Flex and scapt   RTB x10 ea Flex and scapt   RTB x10 ea Flex and scapt   RTB x15 ea    Standing 3 way AROM  x10 ea  x15 ea dir x15 ea dir  x15 ea dir 3# x15 ea dir  3# 2x10 ea dir 3# 3x10 3# 3x 10  4# 2x10    UBE fwd and retro for ROM L6 4'/4' L5 4'/4' L5 4'/4' L5 4'/4' L5 4'/4'  L5 4'/4' L5 4'/4'  4'/4'   Sidelying shoulder ER AROM x20 x20   2# 2x10  3# 2x10       TB ER    GTB 2x10 GTB 3x10 GTB 3x10 Blue 2x10 Blue 2x 10      TB IR    Blue 2x10 Blue 3x10 Blue 3x10 Blue 3x10 Blue 3x 10 Bicep curl with ecc control     GTB 2x10  3" lower Blue 2x10 3" lower Blue 3x10 3" lower       90/90 shoulder overhead press with DB             Pt education         HEP review PT POC, HEP   Ther Activity                                       Gait Training                                       Modalities

## 2023-10-02 ENCOUNTER — OFFICE VISIT (OUTPATIENT)
Dept: PHYSICAL THERAPY | Facility: CLINIC | Age: 58
End: 2023-10-02
Payer: OTHER MISCELLANEOUS

## 2023-10-02 DIAGNOSIS — Z47.89 AFTERCARE FOLLOWING SURGERY OF THE MUSCULOSKELETAL SYSTEM: Primary | ICD-10-CM

## 2023-10-02 DIAGNOSIS — S46.012D TRAUMATIC INCOMPLETE TEAR OF LEFT ROTATOR CUFF, SUBSEQUENT ENCOUNTER: ICD-10-CM

## 2023-10-02 PROCEDURE — 97140 MANUAL THERAPY 1/> REGIONS: CPT | Performed by: PHYSICAL THERAPIST

## 2023-10-02 PROCEDURE — 97110 THERAPEUTIC EXERCISES: CPT | Performed by: PHYSICAL THERAPIST

## 2023-10-02 PROCEDURE — 97112 NEUROMUSCULAR REEDUCATION: CPT | Performed by: PHYSICAL THERAPIST

## 2023-10-02 NOTE — PROGRESS NOTES
Daily Note     Today's date: 10/2/2023  Patient name: Rusty Vásquez. : 1965  MRN: [id number]  Referring provider: Jeff Villalpando*  Dx:   Encounter Diagnosis     ICD-10-CM    1. Aftercare following surgery of the musculoskeletal system  Z47.89       2. Traumatic incomplete tear of left rotator cuff, subsequent encounter  S46.012D                      Subjective: No new complaints today. Objective: See treatment diary below      Assessment: Tolerated treatment well. Patient would benefit from continued PT. No complaints of pain during session. Reports feeling "stretched out" after PROM. Plan: Continue per plan of care. Precautions: status post left shoulder arthroscopy, extensive debridement, and biceps tenodesis on 2023  Access Code: [email or web address]        POC expires Auth Status Unit limit Start date  Expiration date PT/OT + Visit Limit? [address] [place] [address]       Re-eval by 10/25 N/A N/A N//A N/A WC BOMN               Visit/Unit Tracking  AUTH Status:  Date 9/5 9/8 9/13 9/15 9/18 9/22 9/25 10/2 8/22 8/24 8/29 8/31   BOMN Used 13 14 15 16 17 18 19 20 9 10 11 12    Remaining                    Manuals 8/29 8/31 9/5 9/8 9/13 9/15 9/18 9/22 9/25 10/2   L Shoulder PROM BE BE BE BE BE BE BE SC BE JF                Reassessment         BE    Neuro Re-Ed             Bent over Y,T,I   L only x10 ea dir  x10 ea dir 3" x10 ea dir     3# x10 ea dir  3# 10x ea. 3# 10x ea.    Bent over row x10 x10 3# 2x10 3# 2x10 3# 3x10  5# 2x10 5# 3x10  5# 2x 10   5# 2x 10    TB rows   GTB 5"2x10 GTB 5"2x10 GTB 5" 3x10 GTB 5" 3x10 Blue 5" 2x10 Blue 3x 10  Blue 3x 10 Blue 3x 10   TB pulldowns    GTB 5"2x10 GTB 5"2x10 GTB 5" 3x10 GTB 5" 3x10 Blue 5" 2x10 Blue 3x 10  Blue 3x 10 Blue 3x 10   Wall walks with TB             TB PNF D2 flex                          Ther Ex             Wall slides flex      Flex and scapt   RTB x10 ea  Flex and scapt   RTB x10 ea Flex and scapt   RTB x10 ea Flex and scapt RTB x15 ea Flex and scapt   RTB x15 ea   Standing 3 way AROM  x10 ea  x15 ea dir x15 ea dir  x15 ea dir 3# x15 ea dir  3# 2x10 ea dir 3# 3x10 3# 3x 10  4# 2x10 4# 2x10   UBE fwd and retro for ROM L6 4'/4' L5 4'/4' L5 4'/4' L5 4'/4' L5 4'/4'  L5 4'/4' L5 4'/4' L5 4'/4' L5 4'/4'   Sidelying shoulder ER AROM x20 x20   2# 2x10  3# 2x10       TB ER    GTB 2x10 GTB 3x10 GTB 3x10 Blue 2x10 Blue 2x 10   Blue 2x 10    TB IR    Blue 2x10 Blue 3x10 Blue 3x10 Blue 3x10 Blue 3x 10   Blue 3x 10    Bicep curl with ecc control     GTB 2x10  3" lower Blue 2x10 3" lower Blue 3x10 3" lower       90/90 shoulder overhead press with DB             Pt education         HEP review    Ther Activity                                       Gait Training                                       Modalities

## 2023-10-03 ENCOUNTER — APPOINTMENT (OUTPATIENT)
Dept: PHYSICAL THERAPY | Facility: CLINIC | Age: 58
End: 2023-10-03
Payer: OTHER MISCELLANEOUS

## 2023-10-06 ENCOUNTER — OFFICE VISIT (OUTPATIENT)
Dept: PHYSICAL THERAPY | Facility: CLINIC | Age: 58
End: 2023-10-06
Payer: OTHER MISCELLANEOUS

## 2023-10-06 DIAGNOSIS — Z47.89 AFTERCARE FOLLOWING SURGERY OF THE MUSCULOSKELETAL SYSTEM: Primary | ICD-10-CM

## 2023-10-06 DIAGNOSIS — S46.012D TRAUMATIC INCOMPLETE TEAR OF LEFT ROTATOR CUFF, SUBSEQUENT ENCOUNTER: ICD-10-CM

## 2023-10-06 PROCEDURE — 97112 NEUROMUSCULAR REEDUCATION: CPT

## 2023-10-06 PROCEDURE — 97110 THERAPEUTIC EXERCISES: CPT

## 2023-10-06 PROCEDURE — 97140 MANUAL THERAPY 1/> REGIONS: CPT

## 2023-10-06 NOTE — PROGRESS NOTES
Daily Note     Today's date: 10/6/2023  Patient name: Tom Phillips. : 1965  MRN: [id number]  Referring provider: Royce Us*  Dx:   Encounter Diagnosis     ICD-10-CM    1. Aftercare following surgery of the musculoskeletal system  Z47.89       2. Traumatic incomplete tear of left rotator cuff, subsequent encounter  S46.012D           Start Time: 732  Stop Time: 818  Total time in clinic (min): 46 minutes    Subjective: Pt noted that he has "sitting" pains. Pt noted that at times his L shoulder feels unstable at times. Objective: See treatment diary below      Assessment: Continued treatment session with focus on L shoulder s/p surgery. ~ 11 weeks s/p treatment session. Was able to complete all exercises with proper form of completion with  Scapular retraction. Pt noticed some decrease challenge with wall slides w/ TB this visit. Tolerated treatment well. Patient exhibited good technique with therapeutic exercises and would benefit from continued PT     Education reviewed with patient with verbal agreement and understanding.   - HEP compliance on an every other day basis about 2x a week. - DOMS 24 to 48 hours of muscle soreness. - Modalities such as CP 10 - 20 min on and 60 min off  Prior to reapplication if needed    Plan: Continue per plan of care. Precautions: status post left shoulder arthroscopy, extensive debridement, and biceps tenodesis on 2023  Access Code: [email or web address]        POC expires Auth Status Unit limit Start date  Expiration date PT/OT + Visit Limit?  [address] [place] [address]       Re-eval by 10/25 N/A N/A N//A N/A WC BOMN               Visit/Unit Tracking  AUTH Status:  Date 9/5 9/8 9/13 9/15 9/18 9/22 9/25 10/2 10/6  8/29 8/31   BOMN Used 13 14 15 16 17 18 19 20 21  11 12    Remaining                    Manuals 10/6  9/5 9/8 9/13 9/15 9/18 9/22 9/25 10/2   L Shoulder PROM SC  BE BE BE BE BE SC BE JF                Reassessment         BE    Neuro Re-Ed             Bent over Y,T,I   L only 3# 10x   3" x10 ea dir     3# x10 ea dir  3# 10x ea. 3# 10x ea. Bent over row 5# 2x 10   3# 2x10 3# 2x10 3# 3x10  5# 2x10 5# 3x10  5# 2x 10   5# 2x 10    TB rows Blue 3x 10   GTB 5"2x10 GTB 5"2x10 GTB 5" 3x10 GTB 5" 3x10 Blue 5" 2x10 Blue 3x 10  Blue 3x 10 Blue 3x 10   TB pulldowns  Blue 3x 10   GTB 5"2x10 GTB 5"2x10 GTB 5" 3x10 GTB 5" 3x10 Blue 5" 2x10 Blue 3x 10  Blue 3x 10 Blue 3x 10   Wall walks with TB             TB PNF D2 flex                          Ther Ex             Wall slides flex Flex and scapt   RTB x15 ea     Flex and scapt   RTB x10 ea  Flex and scapt   RTB x10 ea Flex and scapt   RTB x10 ea Flex and scapt   RTB x15 ea Flex and scapt   RTB x15 ea   Standing 3 way AROM  4# 2x 10   x15 ea dir  x15 ea dir 3# x15 ea dir  3# 2x10 ea dir 3# 3x10 3# 3x 10  4# 2x10 4# 2x10   UBE fwd and retro for ROM   L5 4'/4' L5 4'/4' L5 4'/4'  L5 4'/4' L5 4'/4' L5 4'/4' L5 4'/4'   Sidelying shoulder ER AROM     2# 2x10  3# 2x10       TB ER BTB 2x 10    GTB 2x10 GTB 3x10 GTB 3x10 Blue 2x10 Blue 2x 10   Blue 2x 10    TB IR BTB  2x 10    Blue 2x10 Blue 3x10 Blue 3x10 Blue 3x10 Blue 3x 10   Blue 3x 10    Bicep curl with ecc control     GTB 2x10  3" lower Blue 2x10 3" lower Blue 3x10 3" lower       90/90 shoulder overhead press with DB             Pt education         HEP review    Ther Activity                                       Gait Training                                       Modalities                                                        billed for 24 minutes on 10/6/23. 1 on 1 time with patient.

## 2023-10-10 ENCOUNTER — OFFICE VISIT (OUTPATIENT)
Dept: PHYSICAL THERAPY | Facility: CLINIC | Age: 58
End: 2023-10-10
Payer: OTHER MISCELLANEOUS

## 2023-10-10 DIAGNOSIS — Z47.89 AFTERCARE FOLLOWING SURGERY OF THE MUSCULOSKELETAL SYSTEM: Primary | ICD-10-CM

## 2023-10-10 DIAGNOSIS — S46.012D TRAUMATIC INCOMPLETE TEAR OF LEFT ROTATOR CUFF, SUBSEQUENT ENCOUNTER: ICD-10-CM

## 2023-10-10 PROCEDURE — 97110 THERAPEUTIC EXERCISES: CPT

## 2023-10-10 PROCEDURE — 97140 MANUAL THERAPY 1/> REGIONS: CPT

## 2023-10-10 PROCEDURE — 97112 NEUROMUSCULAR REEDUCATION: CPT

## 2023-10-10 NOTE — PROGRESS NOTES
Daily Note     Today's date: 10/10/2023  Patient name: Tressa Galeana : 1965  MRN: [id number]  Referring provider: Inocente Weston*  Dx:   Encounter Diagnosis     ICD-10-CM    1. Aftercare following surgery of the musculoskeletal system  Z47.89       2. Traumatic incomplete tear of left rotator cuff, subsequent encounter  S46.012D           Start Time: 803  Stop Time: 845  Total time in clinic (min): 42 minutes    Subjective: Pt noted that he was painting is fireplace and noted no pain in L shoulder while painting but did note have some discomfort while laying on L side. Objective: See treatment diary below      Assessment:  Continued with treatment session with focus on L shoulder within protocol at this time. approx ~ 12 weeks s/p OOS. Tolerated treatment well. Progressions made this visit with proper demonstration of scapular setting of L shoulder. Added some PNF D2 flexion w/ TB. Increased reps   Patient exhibited good technique with therapeutic exercises and would benefit from continued PT. Secondary to progressions patient noted that he did have some increase in muscle soreness. Education reviewed with patient with verbal agreement and understanding.   - HEP compliance on an every other day basis about 2x a week. Received an updated BTB for HEP use. - DOMS 24 to 48 hours of muscle soreness. - Modalities such as CP 10 - 20 min on and 60 min off  Prior to reapplication if needed         Plan: Continue per plan of care. Precautions: status post left shoulder arthroscopy, extensive debridement, and biceps tenodesis on 2023  Access Code: [email or web address] - updated on 10/10/23        POC expires Auth Status Unit limit Start date  Expiration date PT/OT + Visit Limit?  [address] [place] [address]       Re-eval by 10/25 N/A N/A N//A N/A  BOMN               Visit/Unit Tracking  AUTH Status:  Date 9/5 9/8 9/13 9/15 9/18 9/22 9/25 10/2 10/6 10/10  8/31   BOMN Used 13 14 15 16 17 18 19 20 21 22  12    Remaining                    Manuals 10/6 10/10   9/13 9/15 9/18 9/22 9/25 10/2   L Shoulder PROM SC SC   BE BE BE SC BE JF                Reassessment         BE    Neuro Re-Ed             Bent over Y,T,I   L only 3# 10x  3# 2x10     3# x10 ea dir  3# 10x ea. 3# 10x ea.    Bent over row 5# 2x 10  5# 2x 10    3# 3x10  5# 2x10 5# 3x10  5# 2x 10   5# 2x 10    TB rows Blue 3x 10  BTB 3x 10    GTB 5" 3x10 GTB 5" 3x10 Blue 5" 2x10 Blue 3x 10  Blue 3x 10 Blue 3x 10   TB pulldowns  Blue 3x 10  BTB 3x 10    GTB 5" 3x10 GTB 5" 3x10 Blue 5" 2x10 Blue 3x 10  Blue 3x 10 Blue 3x 10   Wall walks with TB  RTB 7x            TB PNF D2 flex  GTB 2x 10                         Ther Ex             Wall slides flex Flex and scapt   RTB x15 ea Flex and scaption 2x 10     Flex and scapt   RTB x10 ea  Flex and scapt   RTB x10 ea Flex and scapt   RTB x10 ea Flex and scapt   RTB x15 ea Flex and scapt   RTB x15 ea   Standing 3 way AROM  4# 2x 10  NV   3# x15 ea dir  3# 2x10 ea dir 3# 3x10 3# 3x 10  4# 2x10 4# 2x10   UBE fwd and retro for ROM  L5 4'4/'   L5 4'/4'  L5 4'/4' L5 4'/4' L5 4'/4' L5 4'/4'   Sidelying shoulder ER AROM     2# 2x10  3# 2x10       TB ER BTB 2x 10  BTB 2x 10    GTB 3x10 GTB 3x10 Blue 2x10 Blue 2x 10   Blue 2x 10    TB IR BTB  2x 10  BTB 2x 10    Blue 3x10 Blue 3x10 Blue 3x10 Blue 3x 10   Blue 3x 10    Bicep curl with ecc control      Blue 2x10 3" lower Blue 3x10 3" lower       90/90 shoulder overhead press with DB  NV           Pt education         HEP review    Ther Activity                                       Gait Training                                       Modalities

## 2023-10-13 ENCOUNTER — OFFICE VISIT (OUTPATIENT)
Dept: PHYSICAL THERAPY | Facility: CLINIC | Age: 58
End: 2023-10-13
Payer: OTHER MISCELLANEOUS

## 2023-10-13 DIAGNOSIS — S46.012D TRAUMATIC INCOMPLETE TEAR OF LEFT ROTATOR CUFF, SUBSEQUENT ENCOUNTER: ICD-10-CM

## 2023-10-13 DIAGNOSIS — Z47.89 AFTERCARE FOLLOWING SURGERY OF THE MUSCULOSKELETAL SYSTEM: Primary | ICD-10-CM

## 2023-10-13 PROCEDURE — 97110 THERAPEUTIC EXERCISES: CPT

## 2023-10-13 PROCEDURE — 97140 MANUAL THERAPY 1/> REGIONS: CPT

## 2023-10-13 PROCEDURE — 97112 NEUROMUSCULAR REEDUCATION: CPT

## 2023-10-17 ENCOUNTER — OFFICE VISIT (OUTPATIENT)
Dept: PHYSICAL THERAPY | Facility: CLINIC | Age: 58
End: 2023-10-17
Payer: OTHER MISCELLANEOUS

## 2023-10-17 DIAGNOSIS — S46.012D TRAUMATIC INCOMPLETE TEAR OF LEFT ROTATOR CUFF, SUBSEQUENT ENCOUNTER: ICD-10-CM

## 2023-10-17 DIAGNOSIS — Z47.89 AFTERCARE FOLLOWING SURGERY OF THE MUSCULOSKELETAL SYSTEM: Primary | ICD-10-CM

## 2023-10-17 PROCEDURE — 97112 NEUROMUSCULAR REEDUCATION: CPT

## 2023-10-17 PROCEDURE — 97110 THERAPEUTIC EXERCISES: CPT

## 2023-10-17 PROCEDURE — 97140 MANUAL THERAPY 1/> REGIONS: CPT

## 2023-10-17 NOTE — PROGRESS NOTES
Daily Note     Today's date: 10/17/2023  Patient name: Jese Suárez. : 1965  MRN: [id number]  Referring provider: Naga Hyde*  Dx:   Encounter Diagnosis     ICD-10-CM    1. Aftercare following surgery of the musculoskeletal system  Z47.89       2. Traumatic incomplete tear of left rotator cuff, subsequent encounter  S46.012D                      Subjective: Patient reports that he has some concerns with his shoulder due to continuing to have pain in it. He has discussed this with his surgeon at a prior session and was advised to give it time. He has a follow up with them 23. Objective: See treatment diary below      Assessment: Tolerated treatment well. Progressed patient with overhead DB press and shoulder ER/IR at 90 degrees abduction to facilitate improved shoulder strength at and above shoulder height. He was challenged with progressions and noted muscular fatigue following. Patient demonstrated fatigue post treatment, exhibited good technique with therapeutic exercises, and would benefit from continued PT      Plan: Continue per plan of care. Precautions: status post left shoulder arthroscopy, extensive debridement, and biceps tenodesis on 2023  Access Code: [email or web address] - updated on 10/10/23        POC expires Auth Status Unit limit Start date  Expiration date PT/OT + Visit Limit? [address] [place] [address]       Re-eval by 10/25 N/A N/A N//A N/A  BOMN               Visit/Unit Tracking  AUTH Status:  Date 9/5 9/8 9/13 9/15 9/18 9/22 9/25 10/2 10/6 10/10 10/13 10/17   BOMN Used 13 14 15 16 17 18 19 20 21 22 23 24    Remaining                    Manuals 10/6 10/10 10/13 10/17 9/13 9/15 9/18 9/22 9/25 10/2   L Shoulder PROM SC SC SC BE BE BE BE SC BE JF                Reassessment         BE    Neuro Re-Ed             Bent over Y,T,I   L only 3# 10x  3# 2x10 3# 2x 10     3# x10 ea dir  3# 10x ea. 3# 10x ea.    Bent over row 5# 2x 10  5# 2x 10  5# 2x 10   3# 3x10 5# 2x10 5# 3x10  5# 2x 10   5# 2x 10    TB rows Blue 3x 10  BTB 3x 10  BTB 3x 10   GTB 5" 3x10 GTB 5" 3x10 Blue 5" 2x10 Blue 3x 10  Blue 3x 10 Blue 3x 10   TB pulldowns  Blue 3x 10  BTB 3x 10  BTB 3x 10   GTB 5" 3x10 GTB 5" 3x10 Blue 5" 2x10 Blue 3x 10  Blue 3x 10 Blue 3x 10   Wall walks with TB  RTB 7x  NV GTB x6 laps          TB PNF D2 flex  GTB 2x10  NV GTB 2x10          Wall clocks with TB    GTB x6 laps                       Ther Ex             Wall slides flex Flex and scapt   RTB x15 ea Flex and scaption 2x 10     Flex and scapt   RTB x10 ea  Flex and scapt   RTB x10 ea Flex and scapt   RTB x10 ea Flex and scapt   RTB x15 ea Flex and scapt   RTB x15 ea   Standing 3 way AROM  4# 2x 10  NV   3# x15 ea dir  3# 2x10 ea dir 3# 3x10 3# 3x 10  4# 2x10 4# 2x10   UBE fwd and retro for ROM  L5 4'4/' L5 4'/4' L5 4'/4' L5 4'/4'  L5 4'/4' L5 4'/4' L5 4'/4' L5 4'/4'   Sidelying shoulder ER AROM     2# 2x10  3# 2x10       TB ER BTB 2x 10  BTB 2x 10  BTB 2x 10  At 90* abd   GTB x15 GTB 3x10 GTB 3x10 Blue 2x10 Blue 2x 10   Blue 2x 10    TB IR BTB  2x 10  BTB 2x 10  BTB 2x 10  At 90* abd   GTB x15 Blue 3x10 Blue 3x10 Blue 3x10 Blue 3x 10   Blue 3x 10    Bicep curl with ecc control     10# 3" lower 2x10 Blue 2x10 3" lower Blue 3x10 3" lower       90/90 shoulder overhead press with DB  NV  4# x10          Pt education         HEP review    Ther Activity                                       Gait Training                                       Modalities

## 2023-10-18 ENCOUNTER — OFFICE VISIT (OUTPATIENT)
Dept: PHYSICAL THERAPY | Facility: CLINIC | Age: 58
End: 2023-10-18
Payer: OTHER MISCELLANEOUS

## 2023-10-18 DIAGNOSIS — S46.012D TRAUMATIC INCOMPLETE TEAR OF LEFT ROTATOR CUFF, SUBSEQUENT ENCOUNTER: ICD-10-CM

## 2023-10-18 DIAGNOSIS — Z47.89 AFTERCARE FOLLOWING SURGERY OF THE MUSCULOSKELETAL SYSTEM: Primary | ICD-10-CM

## 2023-10-18 PROCEDURE — 97140 MANUAL THERAPY 1/> REGIONS: CPT

## 2023-10-18 PROCEDURE — 97112 NEUROMUSCULAR REEDUCATION: CPT

## 2023-10-18 PROCEDURE — 97110 THERAPEUTIC EXERCISES: CPT

## 2023-10-18 NOTE — PROGRESS NOTES
Daily Note     Today's date: 10/18/2023  Patient name: Mary Ferrara. : 1965  MRN: [id number]  Referring provider: Norm Burkitt*  Dx:   Encounter Diagnosis     ICD-10-CM    1. Aftercare following surgery of the musculoskeletal system  Z47.89       2. Traumatic incomplete tear of left rotator cuff, subsequent encounter  S46.012D                      Subjective: pt reports mild soreness from yesterdays visits. Objective: See treatment diary below      Assessment: Tolerated treatment well. Patient continues to perform well with progressions. Resisted ER  and overhead motions are most challenging. Early onset fatigue noted with stability tasks. Haven Sauce continues to benefit from PROM to promote shoulder mobility. Continued PT would be beneficial to improve function. Plan: Continue per plan of care. Precautions: status post left shoulder arthroscopy, extensive debridement, and biceps tenodesis on 2023  Access Code: [email or web address] - updated on 10/10/23        POC expires Auth Status Unit limit Start date  Expiration date PT/OT + Visit Limit? [address] [place] [address]       Re-eval by 10/25 N/A N/A N//A N/A WC BOMN               Visit/Unit Tracking  AUTH Status:  Date 9/8 9/13 9/15 9/18 9/22 9/25 10/2 10/6 10/10 10/13 10/17 10/18   BOMN Used 14 15 16 17 18 19 20 21 22 23 24 25    Remaining                    Manuals 10/6 10/10 10/13 10/17 10/18 9/15 9/18 9/22 9/25 10/2   L Shoulder PROM SC SC SC BE MB BE BE SC BE JF                Reassessment         BE    Neuro Re-Ed             Bent over Y,T,I   L only 3# 10x  3# 2x10 3# 2x 10     3# x10 ea dir  3# 10x ea. 3# 10x ea.    Bent over row 5# 2x 10  5# 2x 10  5# 2x 10    5# 2x10 5# 3x10  5# 2x 10   5# 2x 10    TB rows Blue 3x 10  BTB 3x 10  BTB 3x 10    GTB 5" 3x10 Blue 5" 2x10 Blue 3x 10  Blue 3x 10 Blue 3x 10   TB pulldowns  Blue 3x 10  BTB 3x 10  BTB 3x 10    GTB 5" 3x10 Blue 5" 2x10 Blue 3x 10  Blue 3x 10 Blue 3x 10   Wall walks with TB  RTB 7x  NV GTB x6 laps  GTB x6 laps         TB PNF D2 flex  GTB 2x10  NV GTB 2x10  GTB 2x10         Wall clocks with TB    GTB x6 laps  GTB x6 laps                      Ther Ex             Wall slides flex Flex and scapt   RTB x15 ea Flex and scaption 2x 10     Flex and scapt   RTB x10 ea  Flex and scapt   RTB x10 ea Flex and scapt   RTB x10 ea Flex and scapt   RTB x15 ea Flex and scapt   RTB x15 ea   Standing 3 way AROM  4# 2x 10  NV    3# 2x10 ea dir 3# 3x10 3# 3x 10  4# 2x10 4# 2x10   UBE fwd and retro for ROM  L5 4'4/' L5 4'/4' L5 4'/4' L5 4'/4'  L5 4'/4' L5 4'/4' L5 4'/4' L5 4'/4'   Sidelying shoulder ER AROM      3# 2x10       TB ER BTB 2x 10  BTB 2x 10  BTB 2x 10  At 90* abd   GTB x15 at 90* abd   GTB x15 GTB 3x10 Blue 2x10 Blue 2x 10   Blue 2x 10    TB IR BTB  2x 10  BTB 2x 10  BTB 2x 10  At 90* abd   GTB x15 at 90* abd   GTB x15 Blue 3x10 Blue 3x10 Blue 3x 10   Blue 3x 10    Bicep curl with ecc control     10# 3" lower 2x10 10# 3" lower 2x10 Blue 3x10 3" lower       90/90 shoulder overhead press with DB  NV  4# x10  4# x10         Pt education         HEP review    Ther Activity                                       Gait Training                                       Modalities

## 2023-10-19 ENCOUNTER — TELEPHONE (OUTPATIENT)
Dept: OBGYN CLINIC | Facility: HOSPITAL | Age: 58
End: 2023-10-19

## 2023-10-19 NOTE — TELEPHONE ENCOUNTER
Caller: Scotty Deluca- Work [employer]     Doctor: Dr. Sakshi Heard    Reason for call:  faxing over a job description paper for disability. I gave the fax number of [phone], he would like the doctor to comment on the form and fax back. Please keep an eye out for fax.      Call back#: [phone]     Fax# [phone]

## 2023-10-20 ENCOUNTER — APPOINTMENT (OUTPATIENT)
Dept: PHYSICAL THERAPY | Facility: CLINIC | Age: 58
End: 2023-10-20
Payer: OTHER MISCELLANEOUS

## 2023-10-20 ENCOUNTER — TELEPHONE (OUTPATIENT)
Age: 58
End: 2023-10-20

## 2023-10-20 NOTE — TELEPHONE ENCOUNTER
Caller: LEEANN    Doctor/Office: Neena    CB#: N/A      What needs to be faxed: Last office visit and last letter to return to work     ATTN to: Racheal Loco    Fax#: [phone]      Documents were successfully e-faxed

## 2023-10-24 ENCOUNTER — OFFICE VISIT (OUTPATIENT)
Dept: PHYSICAL THERAPY | Facility: CLINIC | Age: 58
End: 2023-10-24
Payer: OTHER MISCELLANEOUS

## 2023-10-24 DIAGNOSIS — S46.012D TRAUMATIC INCOMPLETE TEAR OF LEFT ROTATOR CUFF, SUBSEQUENT ENCOUNTER: ICD-10-CM

## 2023-10-24 DIAGNOSIS — Z47.89 AFTERCARE FOLLOWING SURGERY OF THE MUSCULOSKELETAL SYSTEM: Primary | ICD-10-CM

## 2023-10-24 PROCEDURE — 97140 MANUAL THERAPY 1/> REGIONS: CPT | Performed by: PHYSICAL THERAPIST

## 2023-10-24 PROCEDURE — 97112 NEUROMUSCULAR REEDUCATION: CPT | Performed by: PHYSICAL THERAPIST

## 2023-10-24 PROCEDURE — 97110 THERAPEUTIC EXERCISES: CPT | Performed by: PHYSICAL THERAPIST

## 2023-10-24 NOTE — PROGRESS NOTES
Daily Note     Today's date: 10/24/2023  Patient name: Tressa Ugarte. : 1965  MRN: [id number]  Referring provider: Inocente Weston*  Dx:   Encounter Diagnosis     ICD-10-CM    1. Aftercare following surgery of the musculoskeletal system  Z47.89       2. Traumatic incomplete tear of left rotator cuff, subsequent encounter  S46.012D                      Subjective: Pt reports that his arm is ok. Objective: See treatment diary below      Assessment: Tolerated treatment well. Patient demonstrated fatigue post treatment and would benefit from continued PT. Pt was able to perform standing 3 way with good form without the use of UT compensations however unable to perform within full ROM. He continued to have end range restrictions with PROM all directions most loss of abduction. Plan: Continue per plan of care. Progress treatment as tolerated. Precautions: status post left shoulder arthroscopy, extensive debridement, and biceps tenodesis on 2023  Access Code: [email or web address] - updated on 10/10/23        POC expires Auth Status Unit limit Start date  Expiration date PT/OT + Visit Limit? [address] [place] [address]       Re-eval by 10/25 N/A N/A N//A N/A WC BOMN               Visit/Unit Tracking  AUTH Status:  Date 10/24  9/15 9/18 9/22 9/25 10/2 10/6 10/10 10/13 10/17 10/18   BOMN Used 26  16 17 18 19 20 21 22 23 24 25    Remaining                    Manuals 10/18  10/13 10/17 10/18 9/15 9/18 9/22 9/25 10/2   L Shoulder PROM KB  SC BE MB BE BE SC BE JF                Reassessment         BE    Neuro Re-Ed             Bent over Y,T,I   L only 3# 10x ea  3# 2x 10     3# x10 ea dir  3# 10x ea. 3# 10x ea.    Bent over row 5# 2x 10  5# 2x 10    5# 2x10 5# 3x10  5# 2x 10   5# 2x 10    TB rows Blue 3x10  BTB 3x 10    GTB 5" 3x10 Blue 5" 2x10 Blue 3x 10  Blue 3x 10 Blue 3x 10   TB pulldowns  Blue 3x10  BTB 3x 10    GTB 5" 3x10 Blue 5" 2x10 Blue 3x 10  Blue 3x 10 Blue 3x 10   Wall walks with TB NV GTB x6 laps  GTB x6 laps         TB PNF D2 flex   NV GTB 2x10  GTB 2x10         Wall clocks with TB    GTB x6 laps  GTB x6 laps                      Ther Ex             Wall slides flex Flex and scapt   RTB x20 ea     Flex and scapt   RTB x10 ea  Flex and scapt   RTB x10 ea Flex and scapt   RTB x10 ea Flex and scapt   RTB x15 ea Flex and scapt   RTB x15 ea   Standing 3 way AROM  4# 2x10     3# 2x10 ea dir 3# 3x10 3# 3x 10  4# 2x10 4# 2x10   UBE fwd and retro for ROM L5 4'/4'  L5 4'/4' L5 4'/4' L5 4'/4'  L5 4'/4' L5 4'/4' L5 4'/4' L5 4'/4'   Sidelying shoulder ER AROM      3# 2x10       TB ER Blue 2x 10   BTB 2x 10  At 90* abd   GTB x15 at 90* abd   GTB x15 GTB 3x10 Blue 2x10 Blue 2x 10   Blue 2x 10    TB IR Blue 3x 10  BTB 2x 10  At 90* abd   GTB x15 at 90* abd   GTB x15 Blue 3x10 Blue 3x10 Blue 3x 10   Blue 3x 10    Bicep curl with ecc control     10# 3" lower 2x10 10# 3" lower 2x10 Blue 3x10 3" lower       90/90 shoulder overhead press with DB    4# x10  4# x10         Pt education         HEP review    Ther Activity                                       Gait Training                                       Modalities

## 2023-10-27 ENCOUNTER — OFFICE VISIT (OUTPATIENT)
Dept: PHYSICAL THERAPY | Facility: CLINIC | Age: 58
End: 2023-10-27
Payer: OTHER MISCELLANEOUS

## 2023-10-27 DIAGNOSIS — Z47.89 AFTERCARE FOLLOWING SURGERY OF THE MUSCULOSKELETAL SYSTEM: Primary | ICD-10-CM

## 2023-10-27 DIAGNOSIS — S46.012D TRAUMATIC INCOMPLETE TEAR OF LEFT ROTATOR CUFF, SUBSEQUENT ENCOUNTER: ICD-10-CM

## 2023-10-27 PROCEDURE — 97112 NEUROMUSCULAR REEDUCATION: CPT

## 2023-10-27 PROCEDURE — 97110 THERAPEUTIC EXERCISES: CPT

## 2023-10-27 PROCEDURE — 97140 MANUAL THERAPY 1/> REGIONS: CPT

## 2023-10-27 NOTE — PROGRESS NOTES
Daily Note     Today's date: 10/27/2023  Patient name: Ale Bose : 1965  MRN: [id number]  Referring provider: Elliott Solorzano*  Dx:   Encounter Diagnosis     ICD-10-CM    1. Aftercare following surgery of the musculoskeletal system  Z47.89       2. Traumatic incomplete tear of left rotator cuff, subsequent encounter  S46.012D           Start Time: 0815  Stop Time: 0900  Total time in clinic (min): 45 minutes    Subjective: No new changes noted upon arrival for treatment session. Objective: See treatment diary below      Assessment: Continued with treatment session with focus on L shoulder s/p surgery. At this time patient is ~ 14 weeks OOS. Progressions within protocol at this time challenged with exercises completed. Challenged > with scaption wall sides vs. Flexion of L shoulder with RTB. Tolerated treatment well. Patient exhibited good technique with therapeutic exercises and would benefit from continued PT. Education reviewed with patient with verbal agreement and understanding.   - HEP compliance with daily to every other day basis. - DOMS 24 to 48 hours of muscle soreness. - Modalities such as CP 10 - 20 min on and 60 min off  Prior to reapplication if needed    Plan: Continue per plan of care. Precautions: status post left shoulder arthroscopy, extensive debridement, and biceps tenodesis on 2023  Access Code: [email or web address] - updated on 10/10/23        POC expires Auth Status Unit limit Start date  Expiration date PT/OT + Visit Limit?  [address] [place] [address]       Re-eval by 10/25 N/A N/A N//A N/A WC BOMN               Visit/Unit Tracking  AUTH Status:  Date 10/24  9/15 9/18 9/22 9/25 10/2 10/6 10/10 10/13 10/17 10/18   BOMN Used 26  16 17 18 19 20 21 22 23 24 25    Remaining                    Manuals 10/18 10/27  10/17 10/18 9/15 9/18 9/22 9/25 10/2   L Shoulder PROM KB SC  BE MB BE BE SC BE JF                Reassessment         BE    Neuro Re-Ed Bent over Y,T,I   L only 3# 10x ea 4# 10x      3# x10 ea dir  3# 10x ea. 3# 10x ea.    Bent over row 5# 2x 10 5# 2x 10     5# 2x10 5# 3x10  5# 2x 10   5# 2x 10    TB rows Blue 3x10 BTB 3x 10     GTB 5" 3x10 Blue 5" 2x10 Blue 3x 10  Blue 3x 10 Blue 3x 10   TB pulldowns  Blue 3x10 BTB 3x 10     GTB 5" 3x10 Blue 5" 2x10 Blue 3x 10  Blue 3x 10 Blue 3x 10   Wall walks with TB    GTB x6 laps  GTB x6 laps         TB PNF D2 flex    GTB 2x10  GTB 2x10         Wall clocks with TB    GTB x6 laps  GTB x6 laps                      Ther Ex             Wall slides flex Flex and scapt   RTB x20 ea Flex and scapt   RTB x20 ea    Flex and scapt   RTB x10 ea  Flex and scapt   RTB x10 ea Flex and scapt   RTB x10 ea Flex and scapt   RTB x15 ea Flex and scapt   RTB x15 ea   Standing 3 way AROM  4# 2x10 4# 10x     3# 2x10 ea dir 3# 3x10 3# 3x 10  4# 2x10 4# 2x10   UBE fwd and retro for ROM L5 4'/4' L5 5'/5'  L5 4'/4' L5 4'/4'  L5 4'/4' L5 4'/4' L5 4'/4' L5 4'/4'   Sidelying shoulder ER AROM      3# 2x10       TB ER Blue 2x 10  BTB 2x 10   At 90* abd   GTB x15 at 90* abd   GTB x15 GTB 3x10 Blue 2x10 Blue 2x 10   Blue 2x 10    TB IR Blue 3x 10 BTB 3x 0   At 90* abd   GTB x15 at 90* abd   GTB x15 Blue 3x10 Blue 3x10 Blue 3x 10   Blue 3x 10    Bicep curl with ecc control     10# 3" lower 2x10 10# 3" lower 2x10 Blue 3x10 3" lower       90/90 shoulder overhead press with DB    4# x10  4# x10         Pt education         HEP review    Ther Activity                                       Gait Training                                       Modalities

## 2023-10-31 ENCOUNTER — EVALUATION (OUTPATIENT)
Dept: PHYSICAL THERAPY | Facility: CLINIC | Age: 58
End: 2023-10-31
Payer: OTHER MISCELLANEOUS

## 2023-10-31 DIAGNOSIS — S46.012D TRAUMATIC INCOMPLETE TEAR OF LEFT ROTATOR CUFF, SUBSEQUENT ENCOUNTER: ICD-10-CM

## 2023-10-31 DIAGNOSIS — Z47.89 AFTERCARE FOLLOWING SURGERY OF THE MUSCULOSKELETAL SYSTEM: Primary | ICD-10-CM

## 2023-10-31 PROCEDURE — 97140 MANUAL THERAPY 1/> REGIONS: CPT

## 2023-10-31 PROCEDURE — 97112 NEUROMUSCULAR REEDUCATION: CPT

## 2023-10-31 PROCEDURE — 97110 THERAPEUTIC EXERCISES: CPT

## 2023-10-31 NOTE — PROGRESS NOTES
PT Re-Evaluation     Today's date: 10/31/2023  Patient name: Markos Lemus : 1965  MRN: [id number]   Referring provider: Clem Johnson*  Dx:   Encounter Diagnosis     ICD-10-CM    1. Aftercare following surgery of the musculoskeletal system  Z47.89       2. Traumatic incomplete tear of left rotator cuff, subsequent encounter  S46.012D                      Assessment  Assessment details: Markos Lemus is a 63 y.o. male presenting to physical therapy for a reevaluation with a MD referral of status post left shoulder arthroscopy, extensive debridement, and biceps tenodesis on 2023. Since his initial evaluation, he reports 80% improvement in his shoulder. The patient has demonstrated continued improvements in his shoulder active and passive ROM, and improved shoulder strength. He reports improved shoulder overhead reaching, however does continue to have difficulties and pain with overhead lifting, heavy lifting at his side, or abduction movements of the arm. Despite his improvements, he does have decreased shoulder strength and active ROM, especially with overhead movements, leading to limitations in their ability to complete ADLs, vocational responsibilities, and recreational activities. This patient would benefit from continued PT services to address their impairments and functional limitations to maximize functional outcome. He was educated on importance of continuing to allow time for healing of the shoulder however to discuss his concerns of pain/soreness at his next follow up visit with his surgeon. He was also encouraged to complete more regular HEP completion to facilitate improved shoulder strength.    Impairments: abnormal or restricted ROM, activity intolerance, impaired physical strength, lacks appropriate home exercise program and pain with function    Symptom irritability: moderateUnderstanding of Dx/Px/POC: excellent   Prognosis: good    Goals  STG to be achieved in 4 weeks: The patient will report a decrease in left shoulder "at worst" subjective pain rating score to at least 5/10 to allow for improved tolerance for functional activities. MET  The patient will increase shoulder flexion AROM to at least 100 degrees to allow for improved functional mobility. MET  The patient will increase elbow extension AROM to 0 degrees to allow for improved functional mobility. MET  The patient will increase elbow flexion MMT score to at least 3/5 to allow for improved functional mobility. MET    LTG to be achieved by DC: The patient will be independent in comprehensive HEP. NOT MET  The patient will report no pain with usual activities. NOT MET  The patient will improve all left shoulder AROM to [facility] to allow for improved functional mobility. NOT MET  The patient will improve left elbow flexion and extension AROM to [facility] to allow for improved functional mobility. MET  The patient will increase left elbow flexion and extension MMT score to [facility] to allow for improved functional mobility. NOT MET  The patient will be able to lift 50lb box from waist to chest height. NOT MET  The patient will be able to return to work without restrictions or difficulty. NOT MET      Plan  Patient would benefit from: skilled physical therapy  Planned modality interventions: thermotherapy: hydrocollator packs, TENS and cryotherapy  Planned therapy interventions: manual therapy, joint mobilization, neuromuscular re-education, patient education, flexibility, strengthening, stretching, therapeutic activities, therapeutic exercise, home exercise program and postural training  Frequency: 2x week  Duration in weeks: 8  Plan of Care beginning date: 10/31/2023  Plan of Care expiration date: 12/26/2023  Treatment plan discussed with: patient        Subjective Evaluation    History of Present Illness  Mechanism of injury: Eliazar López reports 80% improvement since beginning PT services.  He reports improvements in his sleeping as he is able to sleep on that side and in his bed. He says that he is able to reach across his body to put on deodorant as well as reach overhead with less difficulty. He says that he still has difficulty with carrying heavy objects in his left arm, such as multiple grocery bags. He says that he also continues to have difficulty with overhead lifting. He also continues to have pain with certain movements of his arms or positions of his overhead lifting. He has a follow up with his orthopedics on 23. He has been completing his HEP 1-2x per week.   Patient Goals  Patient goals for therapy: decreased pain, increased motion, increased strength and return to work    Pain  Current pain ratin  At best pain ratin  At worst pain ratin  Location: left shoulder  Quality: sharp and dull ache  Relieving factors: medications and ice  Aggravating factors: overhead activity and lifting  Progression: improved    Social Support  Lives with: adult children    Employment status: working (- out of work at this time )  Hand dominance: right    Treatments  Previous treatment: immobilization  Current treatment: physical therapy        Objective     Observations     Additional Observation Details  3 arthroscopic incisions well approximated, no signs of infection    Active Range of Motion   Left Shoulder   Flexion: 145 degrees with pain  Abduction: 130 degrees with pain  External rotation BTH: T3   Internal rotation BTB: T6 with pain    Right Shoulder   Flexion: 160 degrees   Abduction: 158 degrees   External rotation BTH: T3   Internal rotation BTB: T6     Left Elbow   Normal active range of motion    Right Elbow   Normal active range of motion    Passive Range of Motion   Left Shoulder   Flexion: 165 degrees with pain  Abduction: 165 degrees with pain  External rotation 45°: 72 degrees with pain  Internal rotation 45°: 65 degrees with pain    Left Elbow   Normal passive range of motion    Strength/Myotome Testing Left Shoulder     Planes of Motion   Flexion: 4+   Abduction: 4- (pain)   External rotation at 0°: 4+   Internal rotation at 0°: 4+     Right Shoulder     Planes of Motion   Flexion: 5   Abduction: 5   External rotation at 0°: 5   Internal rotation at 0°: 5     Left Elbow   Flexion: 5  Extension: 5    Right Elbow   Flexion: 5  Extension: 5             Precautions: status post left shoulder arthroscopy, extensive debridement, and biceps tenodesis on 7/18/2023  Access Code: [email or web address]        POC expires Auth Status Unit limit Start date  Expiration date PT/OT + Visit Limit? Copay/ Co- Insurance   12/26    Re-eval by 11/28 N/A N/A N//A N/A WC BOMN               Visit/Unit Tracking  AUTH Status:  Date 10/24 10/27 10/31  9/22 9/25 10/2 10/6 10/10 10/13 10/17 10/18   BOMN Used 26 27 28  18 19 20 21 22 23 24 25    Remaining                    Manuals 10/24 10/27 10/31  10/18 9/15 9/18 9/22 9/25 10/2   L Shoulder PROM KB SC BE    MB BE BE SC BE JF                Reassessment   BE      BE    Neuro Re-Ed             Bent over Y,T,I   L only 3# 10x ea 4# 10x      3# x10 ea dir  3# 10x ea. 3# 10x ea.    Bent over row 5# 2x 10 5# 2x 10     5# 2x10 5# 3x10  5# 2x 10   5# 2x 10    TB rows Blue 3x10 BTB 3x 10     GTB 5" 3x10 Blue 5" 2x10 Blue 3x 10  Blue 3x 10 Blue 3x 10   TB pulldowns  Blue 3x10 BTB 3x 10     GTB 5" 3x10 Blue 5" 2x10 Blue 3x 10  Blue 3x 10 Blue 3x 10   Wall walks with TB   GTB 6 laps  GTB x6 laps         TB PNF D2 flex     GTB 2x10         Wall clocks with TB   GTB 6 laps   GTB x6 laps         MB on wall up/down, left/right, cw/ccw             Ther Ex             Wall slides flex Flex and scapt   RTB x20 ea Flex and scapt   RTB x20 ea    Flex and scapt   RTB x10 ea  Flex and scapt   RTB x10 ea Flex and scapt   RTB x10 ea Flex and scapt   RTB x15 ea Flex and scapt   RTB x15 ea   Standing 3 way AROM  4# 2x10 4# 10x     3# 2x10 ea dir 3# 3x10 3# 3x 10  4# 2x10 4# 2x10   UBE fwd and retro for ROM L5 4'/4' L5 5'/5' L5 4'/4'  L5 4'/4' L5 4'/4' L5 4'/4' L5 4'/4'   TB ER Blue 2x 10  BTB 2x 10    at 90* abd   GTB x15 GTB 3x10 Blue 2x10 Blue 2x 10   Blue 2x 10    TB IR Blue 3x 10 BTB 3x 0    at 90* abd   GTB x15 Blue 3x10 Blue 3x10 Blue 3x 10   Blue 3x 10    90/90 shoulder overhead press with DB   4# 2x10   4# x10         Pt education   HEP, PT POC,       HEP review    Ther Activity                                       Gait Training                                       Modalities

## 2023-11-01 ENCOUNTER — OFFICE VISIT (OUTPATIENT)
Dept: PHYSICAL THERAPY | Facility: CLINIC | Age: 58
End: 2023-11-01
Payer: OTHER MISCELLANEOUS

## 2023-11-01 DIAGNOSIS — Z47.89 AFTERCARE FOLLOWING SURGERY OF THE MUSCULOSKELETAL SYSTEM: Primary | ICD-10-CM

## 2023-11-01 DIAGNOSIS — S46.012D TRAUMATIC INCOMPLETE TEAR OF LEFT ROTATOR CUFF, SUBSEQUENT ENCOUNTER: ICD-10-CM

## 2023-11-01 PROCEDURE — 97140 MANUAL THERAPY 1/> REGIONS: CPT

## 2023-11-01 PROCEDURE — 97110 THERAPEUTIC EXERCISES: CPT

## 2023-11-01 PROCEDURE — 97112 NEUROMUSCULAR REEDUCATION: CPT

## 2023-11-01 NOTE — PROGRESS NOTES
Daily Note     Today's date: 2023  Patient name: Monica Menjivar. : 1965  MRN: [id number]  Referring provider: Danielle Grant*  Dx:   Encounter Diagnosis     ICD-10-CM    1. Aftercare following surgery of the musculoskeletal system  Z47.89       2. Traumatic incomplete tear of left rotator cuff, subsequent encounter  S46.012D                      Subjective: Pt reports that he is doing okay today still having pain at times with certain degrees of the different positions that he gets in. Notes that he is still having trouble with overhead lifting. Objective: See treatment diary below      Assessment: Tolerated treatment well. Pt was able to perform all strengthening exercises this session with overhead weakness present. Less rest breaks were taken throughout. Patient demonstrated fatigue post treatment, exhibited good technique with therapeutic exercises, and would benefit from continued PT      Plan: Continue per plan of care. Precautions: status post left shoulder arthroscopy, extensive debridement, and biceps tenodesis on 2023  Access Code: [email or web address]        POC expires Auth Status Unit limit Start date  Expiration date PT/OT + Visit Limit? Copay/ Co- Insurance       Re-eval by  N/A N/A N//A N/A WC BOMN               Visit/Unit Tracking  AUTH Status:  Date 10/24 10/27 10/31 11/1  9/25 10/2 10/6 10/10 10/13 10/17 10/18   BOMN Used 26 27 28 29  19 20 21 22 23 24 25    Remaining                    Manuals 10/24 10/27 10/31 11/1  9/15 9/18 9/22 9/25 10/2   L Shoulder PROM KB SC BE   MM  BE BE SC BE JF                Reassessment   BE      BE    Neuro Re-Ed             Bent over Y,T,I   L only 3# 10x ea 4# 10x   4# 15x   3# x10 ea dir  3# 10x ea. 3# 10x ea.    Bent over row 5# 2x 10 5# 2x 10   5# 2x10  5# 2x10 5# 3x10  5# 2x 10   5# 2x 10    TB rows Blue 3x10 BTB 3x 10   BTB 3x10  GTB 5" 3x10 Blue 5" 2x10 Blue 3x 10  Blue 3x 10 Blue 3x 10   TB pulldowns  Blue 3x10 BTB 3x 10   BTB 3x10  GTB 5" 3x10 Blue 5" 2x10 Blue 3x 10  Blue 3x 10 Blue 3x 10   Wall walks with TB   GTB 6 laps GTB 6 laps         TB PNF D2 flex             Wall clocks with TB   GTB 6 laps  GTB 6 laps         MB on wall up/down, left/right, cw/ccw             Ther Ex             Wall slides flex Flex and scapt   RTB x20 ea Flex and scapt   RTB x20 ea    Flex and scapt   RTB x10 ea  Flex and scapt   RTB x10 ea Flex and scapt   RTB x10 ea Flex and scapt   RTB x15 ea Flex and scapt   RTB x15 ea   Standing 3 way AROM  4# 2x10 4# 10x   4# 2x10  3# 2x10 ea dir 3# 3x10 3# 3x 10  4# 2x10 4# 2x10   UBE fwd and retro for ROM L5 4'/4' L5 5'/5'     L5 4'/4' L5 4'/4' L5 4'/4' L5 4'/4'   TB ER Blue 2x 10  BTB 2x 10   BTB 3x10  GTB 3x10 Blue 2x10 Blue 2x 10   Blue 2x 10    TB IR Blue 3x 10 BTB 3x 0   BTB 3x10  Blue 3x10 Blue 3x10 Blue 3x 10   Blue 3x 10    90/90 shoulder overhead press with DB   4# 2x10  4# 2x10         Pt education   HEP, PT POC,  HEP     HEP review    Ther Activity                                       Gait Training                                       Modalities

## 2023-11-03 ENCOUNTER — APPOINTMENT (OUTPATIENT)
Dept: PHYSICAL THERAPY | Facility: CLINIC | Age: 58
End: 2023-11-03
Payer: OTHER MISCELLANEOUS

## 2023-11-07 ENCOUNTER — OFFICE VISIT (OUTPATIENT)
Dept: PHYSICAL THERAPY | Facility: CLINIC | Age: 58
End: 2023-11-07
Payer: OTHER MISCELLANEOUS

## 2023-11-07 DIAGNOSIS — Z47.89 AFTERCARE FOLLOWING SURGERY OF THE MUSCULOSKELETAL SYSTEM: Primary | ICD-10-CM

## 2023-11-07 DIAGNOSIS — S46.012D TRAUMATIC INCOMPLETE TEAR OF LEFT ROTATOR CUFF, SUBSEQUENT ENCOUNTER: ICD-10-CM

## 2023-11-07 PROCEDURE — 97112 NEUROMUSCULAR REEDUCATION: CPT

## 2023-11-07 PROCEDURE — 97110 THERAPEUTIC EXERCISES: CPT

## 2023-11-07 NOTE — PROGRESS NOTES
Daily Note     Today's date: 2023  Patient name: Tressa Ugarte. : 1965  MRN: [id number]  Referring provider: Inocente Weston*  Dx:   Encounter Diagnosis     ICD-10-CM    1. Aftercare following surgery of the musculoskeletal system  Z47.89       2. Traumatic incomplete tear of left rotator cuff, subsequent encounter  S46.012D                      Subjective: Patient reports no changes to his shoulder upon arrival.       Objective: See treatment diary below      Assessment: Tolerated treatment well. Progressed patient from TB resistances to Tha weight column resistances to facilitate improved shoulder strength. He reported muscular fatigue with exercises, however denied increased shoulder pain. Patient demonstrated fatigue post treatment, exhibited good technique with therapeutic exercises, and would benefit from continued PT      Plan: Progress treatment as tolerated. Precautions: status post left shoulder arthroscopy, extensive debridement, and biceps tenodesis on 2023  Access Code: [email or web address]        POC expires Auth Status Unit limit Start date  Expiration date PT/OT + Visit Limit? Copay/ Co- Insurance       Re-eval by  N/A N/A N//A N/A WC BOMN               Visit/Unit Tracking  AUTH Status:  Date 10/24 10/27 10/31 11/1 11/7  10/2 10/6 10/10 10/13 10/17 10/18   BOMN Used 26 27 28 29 30  20 21 22 23 24 25    Remaining                    Manuals 10/24 10/27 10/31 11/1 11/7  9/18 9/22 9/25 10/2   L Shoulder PROM KB SC BE   MM   BE SC BE JF                Reassessment   BE      BE    Neuro Re-Ed             Bent over Y,T,I   L only 3# 10x ea 4# 10x   4# 15x   3# x10 ea dir  3# 10x ea. 3# 10x ea.    Bent over row 5# 2x 10 5# 2x 10   5# 2x10 5# 2x10  5# 3x10  5# 2x 10   5# 2x 10    TB rows Blue 3x10 BTB 3x 10   BTB 3x10 25# 3x10  Blue 5" 2x10 Blue 3x 10  Blue 3x 10 Blue 3x 10   TB pulldowns  Blue 3x10 BTB 3x 10   BTB 3x10 25# 3x10  Blue 5" 2x10 Blue 3x 10  Blue 3x 10 Blue 3x 10   Wall walks with TB   GTB 6 laps GTB 6 laps GTB 6 laps        TB PNF D2 flex             Wall clocks with TB   GTB 6 laps  GTB 6 laps GTB 6 laps        MB on wall up/down, left/right, cw/ccw     X30 ea dir         Ther Ex             Wall slides flex Flex and scapt   RTB x20 ea Flex and scapt   RTB x20 ea     Flex and scapt   RTB x10 ea Flex and scapt   RTB x10 ea Flex and scapt   RTB x15 ea Flex and scapt   RTB x15 ea   Standing 3 way AROM  4# 2x10 4# 10x   4# 2x10 5# 2x10  3# 3x10 3# 3x 10  4# 2x10 4# 2x10   UBE fwd and retro for ROM L5 4'/4' L5 5'/5'   L5 5'/5'  L5 4'/4' L5 4'/4' L5 4'/4' L5 4'/4'   TB ER Blue 2x 10  BTB 2x 10   BTB 3x10 5# 3x10  Blue 2x10 Blue 2x 10   Blue 2x 10    TB IR Blue 3x 10 BTB 3x 0   BTB 3x10 7# 3x10  Blue 3x10 Blue 3x 10   Blue 3x 10    90/90 shoulder overhead press with DB   4# 2x10  4# 2x10 5# x15        Pt education   HEP, PT POC,  HEP     HEP review    Ther Activity                                       Gait Training                                       Modalities

## 2023-11-10 ENCOUNTER — OFFICE VISIT (OUTPATIENT)
Dept: PHYSICAL THERAPY | Facility: CLINIC | Age: 58
End: 2023-11-10
Payer: OTHER MISCELLANEOUS

## 2023-11-10 DIAGNOSIS — S46.012D TRAUMATIC INCOMPLETE TEAR OF LEFT ROTATOR CUFF, SUBSEQUENT ENCOUNTER: ICD-10-CM

## 2023-11-10 DIAGNOSIS — Z47.89 AFTERCARE FOLLOWING SURGERY OF THE MUSCULOSKELETAL SYSTEM: Primary | ICD-10-CM

## 2023-11-10 PROCEDURE — 97110 THERAPEUTIC EXERCISES: CPT

## 2023-11-10 PROCEDURE — 97112 NEUROMUSCULAR REEDUCATION: CPT

## 2023-11-10 NOTE — PROGRESS NOTES
Daily Note     Today's date: 11/10/2023  Patient name: Amberly Lucero : 1965  MRN: [id number]  Referring provider: Jaron Arita*  Dx:   Encounter Diagnosis     ICD-10-CM    1. Aftercare following surgery of the musculoskeletal system  Z47.89       2. Traumatic incomplete tear of left rotator cuff, subsequent encounter  S46.012D                      Subjective: Patient reports that he is seeing his surgeon on Monday and is curious if he'd be able to go back to work with restrictions or light duty. Objective: See treatment diary below      Assessment: Tolerated treatment well. Continues to be fatigued with overhead shoulder strengthening exercises evident with muscular fatigue and burning sensation reported. No increased pain with exercises reported. Patient demonstrated fatigue post treatment, exhibited good technique with therapeutic exercises, and would benefit from continued PT      Plan: Progress treatment as tolerated. Precautions: status post left shoulder arthroscopy, extensive debridement, and biceps tenodesis on 2023  Access Code: [email or web address]        POC expires Auth Status Unit limit Start date  Expiration date PT/OT + Visit Limit? Copay/ Co- Insurance       Re-eval by  N/A N/A N//A N/A WC BOMN               Visit/Unit Tracking  AUTH Status:  Date 10/24 10/27 10/31 11/1 11/7 11/10 10/2 10/6 10/10 10/13 10/17 10/18   BOMN Used 26 27 28 29 30 31 20 21 22 23 24 25    Remaining                    Manuals 10/24 10/27 10/31 11/1 11/7 11/10 9/18 9/22 9/25 10/2   L Shoulder PROM KB SC BE   MM   BE SC BE JF                Reassessment   BE      BE    Neuro Re-Ed             Bent over Y,T,I   L only 3# 10x ea 4# 10x   4# 15x   3# x10 ea dir  3# 10x ea. 3# 10x ea.    Bent over row 5# 2x 10 5# 2x 10   5# 2x10 5# 2x10 5# 3x10 5# 3x10  5# 2x 10   5# 2x 10    TB rows Blue 3x10 BTB 3x 10   BTB 3x10 25# 3x10 25# 3x10 Blue 5" 2x10 Blue 3x 10  Blue 3x 10 Blue 3x 10   TB pulldowns  Blue 3x10 BTB 3x 10   BTB 3x10 25# 3x10 25# 3x10 Blue 5" 2x10 Blue 3x 10  Blue 3x 10 Blue 3x 10   Wall walks with TB   GTB 6 laps GTB 6 laps GTB 6 laps GTB 6 laps       TB PNF D2 flex             Wall clocks with TB   GTB 6 laps  GTB 6 laps GTB 6 laps GTB 6 laps       MB on wall up/down, left/right, cw/ccw     RMB  X30 ea dir  RMB  X30 ea dir        Ther Ex             Wall slides flex Flex and scapt   RTB x20 ea Flex and scapt   RTB x20 ea     Flex and scapt   RTB x10 ea Flex and scapt   RTB x10 ea Flex and scapt   RTB x15 ea Flex and scapt   RTB x15 ea   Standing 3 way AROM  4# 2x10 4# 10x   4# 2x10 5# 2x10 5# 2x10 3# 3x10 3# 3x 10  4# 2x10 4# 2x10   UBE fwd and retro for ROM L5 4'/4' L5 5'/5'   L5 5'/5' L5 5'/5' L5 4'/4' L5 4'/4' L5 4'/4' L5 4'/4'   TB ER Blue 2x 10  BTB 2x 10   BTB 3x10 5# 3x10 5# 3x10 Blue 2x10 Blue 2x 10   Blue 2x 10    TB IR Blue 3x 10 BTB 3x 0   BTB 3x10 7# 3x10 7# 3x10 Blue 3x10 Blue 3x 10   Blue 3x 10    90/90 shoulder overhead press with DB   4# 2x10  4# 2x10 5# x15 5# x15       Pt education   HEP, PT POC,  HEP     HEP review    Ther Activity                                       Gait Training                                       Modalities

## 2023-11-13 ENCOUNTER — TELEPHONE (OUTPATIENT)
Age: 58
End: 2023-11-13

## 2023-11-13 ENCOUNTER — OFFICE VISIT (OUTPATIENT)
Dept: OBGYN CLINIC | Facility: OTHER | Age: 58
End: 2023-11-13

## 2023-11-13 VITALS
HEIGHT: 72 IN | SYSTOLIC BLOOD PRESSURE: 156 MMHG | WEIGHT: 315 LBS | HEART RATE: 58 BPM | BODY MASS INDEX: 42.66 KG/M2 | DIASTOLIC BLOOD PRESSURE: 76 MMHG

## 2023-11-13 DIAGNOSIS — M67.814 BICEPS TENDONOSIS OF LEFT SHOULDER: ICD-10-CM

## 2023-11-13 DIAGNOSIS — Z47.89 AFTERCARE FOLLOWING SURGERY OF THE MUSCULOSKELETAL SYSTEM: Primary | ICD-10-CM

## 2023-11-13 PROCEDURE — 99024 POSTOP FOLLOW-UP VISIT: CPT | Performed by: PHYSICIAN ASSISTANT

## 2023-11-13 NOTE — LETTER
November 13, 2023     Patient: So Jones. YOB: 1965  Date of Visit: 11/13/2023      To Whom it May Concern:    Doug Coon is under my professional care. Juanito Dunn was seen in my office on 11/13/2023. Juanito Dunn may return to work on 11/20/2023 and may return to work with limitations no lifting, pulling, or pushing with left arm . Right hand work is okay. 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 These accommodations cannot be met then he is to remain out of work. We will reevaluate him in approximately 6 weeks and assess his work restrictions at that time. If you have any questions or concerns, please don't hesitate to call.          Sincerely,          Bryant Zapata PA-C        CC: No Recipients

## 2023-11-13 NOTE — PROGRESS NOTES
Assessment:       1. Aftercare following surgery of the musculoskeletal system    2. Biceps tendonosis of left shoulder          Plan:        Patient is making progress in PT. We will ask PT to introduce work conditioning exercises. A return to work note with restrictions is placed in his chart. All questions were addressed to the patient's satisfaction. Follow-up is in 6 weeks to assess patient's progress. Subjective:     Patient ID: Davida Ibarra is a 63 y.o. male. Chief Complaint:    HPI    Patient presents to the office for 17-week follow-up status post left shoulder arthroscopy with biceps tenodesis, 7/19/2023. He is making progress with PT. He continues to have weakness in his left arm/shoulder. Social History     Occupational History    Not on file   Tobacco Use    Smoking status: Never    Smokeless tobacco: Never   Vaping Use    Vaping Use: Never used   Substance and Sexual Activity    Alcohol use: Yes     Comment: once per month    Drug use: Never    Sexual activity: Not on file      Review of Systems   Constitutional: Negative. Respiratory: Negative. Cardiovascular: Negative. Musculoskeletal:  Positive for myalgias. Negative for arthralgias. Skin:  Negative for wound. Neurological:  Positive for weakness. Negative for numbness. Psychiatric/Behavioral: Negative. Objective:     Ortho ExamPhysical Exam  HENT:      Head: Atraumatic. Cardiovascular:      Pulses: Normal pulses. Pulmonary:      Effort: Pulmonary effort is normal.   Musculoskeletal:      Comments: Active range of motion left shoulder: Forward flexion 135 degrees, external rotation 90 degrees, internal rotation to lumbar spine. Skin:     General: Skin is warm and dry. Capillary Refill: Capillary refill takes less than 2 seconds. Comments: Surgical incisions dry and clean, healed. Neurological:      Mental Status: He is alert.    Psychiatric:         Mood and Affect: Mood normal.         Behavior: Behavior normal.

## 2023-11-13 NOTE — TELEPHONE ENCOUNTER
Caller: W/C    Doctor: Chanel Hendrickson     Reason for call: Asked if note is completed and asked for us to fax, all was faxed    [phone]

## 2023-11-14 ENCOUNTER — OFFICE VISIT (OUTPATIENT)
Dept: PHYSICAL THERAPY | Facility: CLINIC | Age: 58
End: 2023-11-14
Payer: OTHER MISCELLANEOUS

## 2023-11-14 DIAGNOSIS — S46.012D TRAUMATIC INCOMPLETE TEAR OF LEFT ROTATOR CUFF, SUBSEQUENT ENCOUNTER: ICD-10-CM

## 2023-11-14 DIAGNOSIS — Z47.89 AFTERCARE FOLLOWING SURGERY OF THE MUSCULOSKELETAL SYSTEM: Primary | ICD-10-CM

## 2023-11-14 PROCEDURE — 97110 THERAPEUTIC EXERCISES: CPT

## 2023-11-14 PROCEDURE — 97530 THERAPEUTIC ACTIVITIES: CPT

## 2023-11-14 PROCEDURE — 97112 NEUROMUSCULAR REEDUCATION: CPT

## 2023-11-14 NOTE — PROGRESS NOTES
Daily Note     Today's date: 2023  Patient name: Delfino Best. : 1965  MRN: [id number]  Referring provider: Ishmael Tillman*  Dx:   Encounter Diagnosis     ICD-10-CM    1. Aftercare following surgery of the musculoskeletal system  Z47.89       2. Traumatic incomplete tear of left rotator cuff, subsequent encounter  S46.012D                      Subjective: Patient offers no complaints to his shoulder. He did see his surgeon yesterday and is returning to work on Monday with restrictions. He was provided an updated script to PT to include "work conditioning". Objective: See treatment diary below      Assessment: Tolerated treatment well. Progressed patient with functional lifting and pushing/pulling activities in preparation to return to work. He was able to complete progressions without pain in the shoulder, however did have generalized fatigue. Patient demonstrated fatigue post treatment, exhibited good technique with therapeutic exercises, and would benefit from continued PT      Plan: Continue per plan of care. Precautions: status post left shoulder arthroscopy, extensive debridement, and biceps tenodesis on 2023  Access Code: [email or web address]        POC expires Auth Status Unit limit Start date  Expiration date PT/OT + Visit Limit? Copay/ Co- Insurance       Re-eval by  N/A N/A N//A N/A WC BOMN               Visit/Unit Tracking  AUTH Status:  Date 10/24 10/27 10/31 11/1 11/7 11/10 11/14  10/10 10/13 10/17 10/18   BOMN Used 26 27 28 29 30 31 32  22 23 24 25    Remaining                    Manuals 10/24 10/27 10/31 11/1 11/7 11/10 11/14 9/22 9/25 10/2   L Shoulder PROM KB SC BE   MM    SC BE JF                Reassessment   BE      BE    Neuro Re-Ed             Bent over Y,T,I   L only 3# 10x ea 4# 10x   4# 15x    3# 10x ea. 3# 10x ea.    Bent over row 5# 2x 10 5# 2x 10   5# 2x10 5# 2x10 5# 3x10  5# 2x 10   5# 2x 10    TB rows Blue 3x10 BTB 3x 10   BTB 3x10 25# 3x10 25# 3x10 30# 3x10 Blue 3x 10  Blue 3x 10 Blue 3x 10   TB pulldowns  Blue 3x10 BTB 3x 10   BTB 3x10 25# 3x10 25# 3x10 30# 3x10 Blue 3x 10  Blue 3x 10 Blue 3x 10   Wall walks with TB   GTB 6 laps GTB 6 laps GTB 6 laps GTB 6 laps       TB PNF D2 flex             Wall clocks with TB   GTB 6 laps  GTB 6 laps GTB 6 laps GTB 6 laps       MB on wall up/down, left/right, cw/ccw     RMB  X30 ea dir  RMB  X30 ea dir  RMB  X30 ea dir       Ther Ex             Wall slides flex Flex and scapt   RTB x20 ea Flex and scapt   RTB x20 ea     DC Flex and scapt   RTB x10 ea Flex and scapt   RTB x15 ea Flex and scapt   RTB x15 ea   Standing 3 way AROM  4# 2x10 4# 10x   4# 2x10 5# 2x10 5# 2x10 5# 2x10 3# 3x 10  4# 2x10 4# 2x10   UBE fwd and retro for ROM L5 4'/4' L5 5'/5'   L5 5'/5' L5 5'/5' L5 5'/5' L5 4'/4' L5 4'/4' L5 4'/4'   TB ER Blue 2x 10  BTB 2x 10   BTB 3x10 5# 3x10 5# 3x10 5# 3x10 Blue 2x 10   Blue 2x 10    TB IR Blue 3x 10 BTB 3x 0   BTB 3x10 7# 3x10 7# 3x10 7# 3x10 Blue 3x 10   Blue 3x 10    90/90 shoulder overhead press with DB   4# 2x10  4# 2x10 5# x15 5# x15 5# 2x15                   Pt education   HEP, PT POC,  HEP     HEP review    Ther Activity             Push/Pullcart        75#   20ft x5 laps      Box lift and carry       25# 20 ft x5 laps      Gait Training                                       Modalities

## 2023-11-16 NOTE — PROGRESS NOTES
Daily Note     Today's date: 2023  Patient name: Rusty Vásquez. : 1965  MRN: [id number]  Referring provider: Jeff Villalpando*  Dx:   Encounter Diagnosis     ICD-10-CM    1. Aftercare following surgery of the musculoskeletal system  Z47.89       2. Traumatic incomplete tear of left rotator cuff, subsequent encounter  S46.012D                      Subjective: ***      Objective: See treatment diary below      Assessment: Tolerated treatment {Tolerated treatment :[id number]}. Patient {assessment:9364409296}      Plan: {PLAN:1133565793}     Precautions: status post left shoulder arthroscopy, extensive debridement, and biceps tenodesis on 2023  Access Code: [email or web address]        POC expires Auth Status Unit limit Start date  Expiration date PT/OT + Visit Limit? Copay/ Co- Insurance       Re-eval by  N/A N/A N//A N/A WC BOMN               Visit/Unit Tracking  AUTH Status:  Date 10/24 10/27 10/31 11/1 11/7 11/10 11/14  10/10 10/13 10/17 10/18   BOMN Used 26 27 28 29 30 31 32  22 23 24 25    Remaining                    Manuals 10/24 10/27 10/31 11/1 11/7 11/10 11/14  9/25 10/2   L Shoulder PROM KB SC BE   MM     BE JF                Reassessment   BE      BE    Neuro Re-Ed             Bent over Y,T,I   L only 3# 10x ea 4# 10x   4# 15x      3# 10x ea.    Bent over row 5# 2x 10 5# 2x 10   5# 2x10 5# 2x10 5# 3x10    5# 2x 10    TB rows Blue 3x10 BTB 3x 10   BTB 3x10 25# 3x10 25# 3x10 30# 3x10  Blue 3x 10 Blue 3x 10   TB pulldowns  Blue 3x10 BTB 3x 10   BTB 3x10 25# 3x10 25# 3x10 30# 3x10  Blue 3x 10 Blue 3x 10   Wall walks with TB   GTB 6 laps GTB 6 laps GTB 6 laps GTB 6 laps       TB PNF D2 flex             Wall clocks with TB   GTB 6 laps  GTB 6 laps GTB 6 laps GTB 6 laps       MB on wall up/down, left/right, cw/ccw     RMB  X30 ea dir  RMB  X30 ea dir  RMB  X30 ea dir       Ther Ex             Wall slides flex Flex and scapt   RTB x20 ea Flex and scapt   RTB x20 ea     DC  Flex and scapt   RTB x15 ea Flex and scapt   RTB x15 ea   Standing 3 way AROM  4# 2x10 4# 10x   4# 2x10 5# 2x10 5# 2x10 5# 2x10  4# 2x10 4# 2x10   UBE fwd and retro for ROM L5 4'/4' L5 5'/5'   L5 5'/5' L5 5'/5' L5 5'/5'  L5 4'/4' L5 4'/4'   TB ER Blue 2x 10  BTB 2x 10   BTB 3x10 5# 3x10 5# 3x10 5# 3x10   Blue 2x 10    TB IR Blue 3x 10 BTB 3x 0   BTB 3x10 7# 3x10 7# 3x10 7# 3x10   Blue 3x 10    90/90 shoulder overhead press with DB   4# 2x10  4# 2x10 5# x15 5# x15 5# 2x15                   Pt education   HEP, PT POC,  HEP     HEP review    Ther Activity             Push/Pullcart        75#   20ft x5 laps      Box lift and carry       25# 20 ft x5 laps      Gait Training                                       Modalities

## 2023-11-17 ENCOUNTER — APPOINTMENT (OUTPATIENT)
Dept: PHYSICAL THERAPY | Facility: CLINIC | Age: 58
End: 2023-11-17
Payer: OTHER MISCELLANEOUS

## 2023-11-17 DIAGNOSIS — Z47.89 AFTERCARE FOLLOWING SURGERY OF THE MUSCULOSKELETAL SYSTEM: Primary | ICD-10-CM

## 2023-11-17 DIAGNOSIS — S46.012D TRAUMATIC INCOMPLETE TEAR OF LEFT ROTATOR CUFF, SUBSEQUENT ENCOUNTER: ICD-10-CM

## 2023-11-21 ENCOUNTER — TELEPHONE (OUTPATIENT)
Dept: PHYSICAL THERAPY | Facility: CLINIC | Age: 58
End: 2023-11-21

## 2023-11-21 NOTE — TELEPHONE ENCOUNTER
LVM for patient regarding NS to visit on 11/21/23 at 8am. He was advised that he has no future appts scheduled at this time and to contact office to R/S as well as schedule more visits.

## 2025-06-18 ENCOUNTER — APPOINTMENT (OUTPATIENT)
Dept: RADIOLOGY | Facility: MEDICAL CENTER | Age: 60
End: 2025-06-18
Attending: PHYSICIAN ASSISTANT
Payer: COMMERCIAL

## 2025-06-18 ENCOUNTER — OFFICE VISIT (OUTPATIENT)
Dept: URGENT CARE | Facility: MEDICAL CENTER | Age: 60
End: 2025-06-18
Payer: COMMERCIAL

## 2025-06-18 VITALS
SYSTOLIC BLOOD PRESSURE: 144 MMHG | DIASTOLIC BLOOD PRESSURE: 69 MMHG | TEMPERATURE: 97.6 F | OXYGEN SATURATION: 97 % | WEIGHT: 315 LBS | RESPIRATION RATE: 18 BRPM | HEART RATE: 74 BPM | BODY MASS INDEX: 45.71 KG/M2

## 2025-06-18 DIAGNOSIS — S63.601A SPRAIN OF RIGHT THUMB, UNSPECIFIED SITE OF DIGIT, INITIAL ENCOUNTER: ICD-10-CM

## 2025-06-18 DIAGNOSIS — M65.319 TRIGGER FINGER OF THUMB, UNSPECIFIED LATERALITY: Primary | ICD-10-CM

## 2025-06-18 PROCEDURE — 73140 X-RAY EXAM OF FINGER(S): CPT

## 2025-06-18 PROCEDURE — G0382 LEV 3 HOSP TYPE B ED VISIT: HCPCS | Performed by: PHYSICIAN ASSISTANT

## 2025-06-18 PROCEDURE — S9083 URGENT CARE CENTER GLOBAL: HCPCS | Performed by: PHYSICIAN ASSISTANT

## 2025-06-18 RX ORDER — PREDNISONE 20 MG/1
40 TABLET ORAL DAILY
Qty: 10 TABLET | Refills: 0 | Status: SHIPPED | OUTPATIENT
Start: 2025-06-18 | End: 2025-06-23

## 2025-06-18 NOTE — PATIENT INSTRUCTIONS
Trigger thumb  Prednisone once daily x 5 days  Refer to orthopedics  Follow up with PCP in 3-5 days.  Proceed to  ER if symptoms worsen.

## 2025-06-18 NOTE — PROGRESS NOTES
[facility]        NAME: Carlos Saucedo Jr. is a 60 y.o. male  : 1965    MRN: [id number]  DATE: 2025  TIME: 6:43 PM    Assessment and Plan   Sprain of right thumb, unspecified site of digit, initial encounter [S63.602H]  1. Sprain of right thumb, unspecified site of digit, initial encounter  XR thumb right first digit-min 2v            Patient Instructions     Trigger thumb  Prednisone once daily x 5 days  Refer to orthopedics  Follow up with PCP in 3-5 days.  Proceed to  ER if symptoms worsen.    Chief Complaint     Chief Complaint   Patient presents with    Thumb Pain     Pt. With right thumb pain that began  to hurt yesterday. States that he cannot close thumb to hand. Denies injury.          History of Present Illness       60-year-old male who presents complaining of thumb getting stuck when he bends it.  Patient states that he has not hurt himself but plays lots of videogames.  Denies fever, trauma, rashes.        Review of Systems   Review of Systems   Constitutional: Negative.    HENT: Negative.     Eyes: Negative.    Respiratory: Negative.  Negative for apnea, cough, choking, chest tightness, shortness of breath, wheezing and stridor.    Cardiovascular: Negative.  Negative for chest pain.   Musculoskeletal:  Positive for arthralgias.         Current Medications     Current Medications[1]    Current Allergies     Allergies as of 2025    (No Known Allergies)            The following portions of the patient's history were reviewed and updated as appropriate: allergies, current medications, past family history, past medical history, past social history, past surgical history and problem list.     Past Medical History[2]    Past Surgical History[3]    Family History[4]      Medications have been verified.        Objective   /69   Pulse 74   Temp 97.6 °F (36.4 °C)   Resp 18   Wt (!) 153 kg (337 lb)   SpO2 97%   BMI 45.71 kg/m²        Physical Exam     Physical  Exam  Constitutional:       General: He is not in acute distress.     Appearance: He is well-developed. He is not diaphoretic.     Cardiovascular:      Rate and Rhythm: Normal rate and regular rhythm.      Heart sounds: Normal heart sounds.   Pulmonary:      Effort: Pulmonary effort is normal. No respiratory distress.      Breath sounds: Normal breath sounds. No wheezing or rales.   Chest:      Chest wall: No tenderness.     Musculoskeletal:        Arms:       Cervical back: Normal range of motion and neck supple.   Lymphadenopathy:      Cervical: No cervical adenopathy.                        [1]   Current Outpatient Medications:     acetaminophen (TYLENOL) 500 mg tablet, Take 500 mg by mouth every 6 (six) hours as needed for mild pain, Disp: , Rfl:     Naproxen Sodium (Aleve) 220 MG CAPS, Take by mouth, Disp: , Rfl:   [2]   Past Medical History:  Diagnosis Date    COVID 12/2022    GERD (gastroesophageal reflux disease)     rare   [3]   Past Surgical History:  Procedure Laterality Date    HERNIA REPAIR      inguinal    MOUTH SURGERY      [place] SURGICAL ARTHROSCOPY SHOULDER W/ROTATOR CUFF RPR Left 7/18/2023    Procedure: SHOULDER ARTHROSCOPY, EXTENSIVE DEBRIDEMENT, BICEPS TENODESIS;  Surgeon: Konstantin Jett MD;  Location: [facility];  Service: Orthopedics   [4]   Family History  Problem Relation Name Age of Onset    Diabetes Mother      Heart disease Father

## 2025-06-18 NOTE — LETTER
June 18, 2025     Patient: Carlos Saucedo Jr.   YOB: 1965   Date of Visit: 6/18/2025       To Whom it May Concern:    Carlos Saucedo was seen in my clinic on 6/18/2025. He may return to school on 6/20/2025.    If you have any questions or concerns, please don't hesitate to call.         Sincerely,          Han Waters PA-C        CC: No Recipients

## 2025-07-16 ENCOUNTER — OFFICE VISIT (OUTPATIENT)
Dept: URGENT CARE | Facility: CLINIC | Age: 60
End: 2025-07-16
Payer: COMMERCIAL

## 2025-07-16 VITALS
TEMPERATURE: 98 F | SYSTOLIC BLOOD PRESSURE: 136 MMHG | DIASTOLIC BLOOD PRESSURE: 80 MMHG | HEART RATE: 69 BPM | RESPIRATION RATE: 18 BRPM | OXYGEN SATURATION: 97 %

## 2025-07-16 DIAGNOSIS — R31.9 URINARY TRACT INFECTION WITH HEMATURIA, SITE UNSPECIFIED: Primary | ICD-10-CM

## 2025-07-16 DIAGNOSIS — R34 DECREASED URINE OUTPUT: ICD-10-CM

## 2025-07-16 DIAGNOSIS — N39.0 URINARY TRACT INFECTION WITH HEMATURIA, SITE UNSPECIFIED: Primary | ICD-10-CM

## 2025-07-16 LAB
SL AMB  POCT GLUCOSE, UA: NEGATIVE
SL AMB LEUKOCYTE ESTERASE,UA: ABNORMAL
SL AMB POCT BILIRUBIN,UA: NEGATIVE
SL AMB POCT BLOOD,UA: ABNORMAL
SL AMB POCT CLARITY,UA: ABNORMAL
SL AMB POCT COLOR,UA: ABNORMAL
SL AMB POCT KETONES,UA: NEGATIVE
SL AMB POCT NITRITE,UA: POSITIVE
SL AMB POCT PH,UA: 5
SL AMB POCT SPECIFIC GRAVITY,UA: 1.02
SL AMB POCT URINE PROTEIN: 30
SL AMB POCT UROBILINOGEN: 0.2

## 2025-07-16 PROCEDURE — 87077 CULTURE AEROBIC IDENTIFY: CPT

## 2025-07-16 PROCEDURE — 87186 SC STD MICRODIL/AGAR DIL: CPT

## 2025-07-16 PROCEDURE — G0382 LEV 3 HOSP TYPE B ED VISIT: HCPCS

## 2025-07-16 PROCEDURE — S9083 URGENT CARE CENTER GLOBAL: HCPCS

## 2025-07-16 PROCEDURE — 81002 URINALYSIS NONAUTO W/O SCOPE: CPT

## 2025-07-16 PROCEDURE — 87086 URINE CULTURE/COLONY COUNT: CPT

## 2025-07-16 RX ORDER — SULFAMETHOXAZOLE AND TRIMETHOPRIM 800; 160 MG/1; MG/1
1 TABLET ORAL EVERY 12 HOURS SCHEDULED
Qty: 14 TABLET | Refills: 0 | Status: SHIPPED | OUTPATIENT
Start: 2025-07-16 | End: 2025-07-23

## 2025-07-16 NOTE — PROGRESS NOTES
"[facility]  Name: Carlos Saucedo Jr.      : 1965      MRN: [id number]  Encounter Provider: ELENITA Fontaine  Encounter Date: 2025   Encounter department: [facility] [place]  :  Assessment & Plan  Decreased urine output    Orders:    POCT urine dip    Urine culture    Urinary tract infection with hematuria, site unspecified    Orders:    sulfamethoxazole-trimethoprim (BACTRIM DS) 800-160 mg per tablet; Take 1 tablet by mouth every 12 (twelve) hours for 7 days    Work note given.     Patient Instructions  Check my chart for urine culture results.     Start antibiotic and take as directed. Take probiotic.    Drink plenty of fluids, water or cranberry juice.   Avoid caffeine and alcohol. Tylenol or Motrin for pain or fever.    Azo for bladder spasms.      Follow up with PCP in 3-5 days.   If you develop fever, flank pain, vomiting, abdominal pain, or any new or concerning symptoms please return or proceed to ER.     Chief Complaint:   Chief Complaint   Patient presents with    Gi/ Issues    Abdominal Pain     Verbalizes \"I can' t piss, I can't Shit, I can't work\". Started over the weekend. Increased fluids.  State he's been bed ridden for 3 days.      History of Present Illness   The patient presents today with complaints of urgency, frequency, decreased urine output, constipation x 3 days. Also reports subjective fevers and dizziness but states that resolved. Last regular formed bowel movement was on Monday. States since then he has only been having bowel movements with mucus. Denies any concerns for STDs and declines testing. He has tried increasing his water intake with minimal improvement. Does feel slightly better today but still with urinary symptoms.           Review of Systems   Constitutional:  Positive for fatigue and fever (subjective; resolved). Negative for chills.   Gastrointestinal:  Positive for constipation. Negative for abdominal pain, diarrhea, nausea " and vomiting.   Genitourinary:  Positive for decreased urine volume, difficulty urinating, dysuria, frequency and urgency. Negative for flank pain, hematuria, penile pain, penile swelling, scrotal swelling and testicular pain.   Musculoskeletal:  Negative for myalgias.   Skin:  Negative for rash.   Neurological:  Positive for light-headedness.     Past Medical History   Past Medical History[1]  Past Surgical History[2]  Family History[3]  he reports that he has never smoked. He has never used smokeless tobacco. He reports current alcohol use. He reports that he does not use drugs.  Current Outpatient Medications   Medication Instructions    acetaminophen (TYLENOL) 500 mg, Every 6 hours PRN    Naproxen Sodium (Aleve) 220 MG CAPS Take by mouth    sulfamethoxazole-trimethoprim (BACTRIM DS) 800-160 mg per tablet 1 tablet, Oral, Every 12 hours scheduled   Allergies[4]     Objective   /80   Pulse 69   Temp 98 °F (36.7 °C)   Resp 18   SpO2 97%      Physical Exam  Vitals and nursing note reviewed.   Constitutional:       General: He is not in acute distress.     Appearance: Normal appearance. He is obese. He is not ill-appearing.   HENT:      Head: Normocephalic and atraumatic.      Right Ear: External ear normal.      Left Ear: External ear normal.      Mouth/Throat:      Lips: Pink.      Mouth: Mucous membranes are moist.     Eyes:      General: Vision grossly intact.      Pupils: Pupils are equal, round, and reactive to light.       Cardiovascular:      Rate and Rhythm: Normal rate and regular rhythm.      Heart sounds: Normal heart sounds. No murmur heard.  Pulmonary:      Effort: Pulmonary effort is normal. No respiratory distress.      Breath sounds: Normal breath sounds. No decreased breath sounds, wheezing, rhonchi or rales.   Abdominal:      General: There is no distension.      Palpations: Abdomen is soft.      Tenderness: There is no abdominal tenderness. There is no right CVA tenderness or left CVA  "tenderness.     Neurological:      Mental Status: He is alert.         Portions of the record may have been created with voice recognition software.  Occasional wrong word or \"sound a like\" substitutions may have occurred due to the inherent limitations of voice recognition software.  Read the chart carefully and recognize, using context, where substitutions have occurred.       [1]   Past Medical History:  Diagnosis Date    COVID 12/2022    GERD (gastroesophageal reflux disease)     rare   [2]   Past Surgical History:  Procedure Laterality Date    HERNIA REPAIR      inguinal    MOUTH SURGERY      [place] SURGICAL ARTHROSCOPY SHOULDER W/ROTATOR CUFF RPR Left 7/18/2023    Procedure: SHOULDER ARTHROSCOPY, EXTENSIVE DEBRIDEMENT, BICEPS TENODESIS;  Surgeon: Konstantin Jett MD;  Location: [facility];  Service: Orthopedics   [3]   Family History  Problem Relation Name Age of Onset    Diabetes Mother      Heart disease Father     [4] No Known Allergies    "

## 2025-07-16 NOTE — LETTER
July 16, 2025     Patient: Carlos Saucedo Jr.   YOB: 1965   Date of Visit: 7/16/2025       To Whom it May Concern:    Carlos Saucedo was seen in my clinic on 7/16/2025.     If you have any questions or concerns, please don't hesitate to call.         Sincerely,          ELENITA Fontaine        CC: No Recipients

## 2025-07-18 LAB — BACTERIA UR CULT: ABNORMAL

## (undated) DEVICE — TUBING SUCTION 5MM X 12 FT

## (undated) DEVICE — DRESSING MEPILEX AG BORDER 4 X 4 IN

## (undated) DEVICE — PACK PBDS SHOULDER ARTHROSCOPY RF

## (undated) DEVICE — 3M™ STERI-STRIP™ REINFORCED ADHESIVE SKIN CLOSURES, R1547, 1/2 IN X 4 IN (12 MM X 100 MM), 6 STRIPS/ENVELOPE: Brand: 3M™ STERI-STRIP™

## (undated) DEVICE — 3M™ IOBAN™ 2 ANTIMICROBIAL INCISE DRAPE 6650EZ: Brand: IOBAN™ 2

## (undated) DEVICE — INTENDED FOR TISSUE SEPARATION, AND OTHER PROCEDURES THAT REQUIRE A SHARP SURGICAL BLADE TO PUNCTURE OR CUT.: Brand: BARD-PARKER SAFETY BLADES SIZE 11, STERILE

## (undated) DEVICE — THREADED CLEAR CANNULA WITH OBTURATOR 7MM X 75MM

## (undated) DEVICE — TUBING ARTHROSCOPIC WAVE  MAIN PUMP

## (undated) DEVICE — GLOVE SRG BIOGEL 7.5

## (undated) DEVICE — THREADED CLEAR CANNULA WITH OBTURATOR 8.5MM X 75MM

## (undated) DEVICE — SUT MONOCRYL 4-0 PS-2 18 IN Y496G

## (undated) DEVICE — DISPOSABLE EQUIPMENT COVER: Brand: SMALL TOWEL DRAPE

## (undated) DEVICE — SHOULDER SUSPENSION KIT 6 PER BOX

## (undated) DEVICE — SPONGE PVP SCRUB WING STERILE

## (undated) DEVICE — BLADE SHAVER DISSECTOR 3.5MM 13CM COOLCUT

## (undated) DEVICE — GLOVE INDICATOR PI UNDERGLOVE SZ 7.5 BLUE

## (undated) DEVICE — CHLORAPREP HI-LITE 26ML ORANGE

## (undated) DEVICE — EXPRESSEW III SUTURE NEEDLE FOR USE WITH EXPRESSEW II OR III SUTURE PASSER: Brand: EXPRESSEW

## (undated) DEVICE — ADHESIVE SKIN HIGH VISCOSITY EXOFIN MICRO 0.5ML